# Patient Record
Sex: MALE | Race: BLACK OR AFRICAN AMERICAN | Employment: FULL TIME | URBAN - METROPOLITAN AREA
[De-identification: names, ages, dates, MRNs, and addresses within clinical notes are randomized per-mention and may not be internally consistent; named-entity substitution may affect disease eponyms.]

---

## 2022-01-10 ENCOUNTER — OFFICE VISIT (OUTPATIENT)
Dept: INTERNAL MEDICINE CLINIC | Age: 32
End: 2022-01-10
Payer: COMMERCIAL

## 2022-01-10 VITALS
WEIGHT: 177 LBS | OXYGEN SATURATION: 99 % | TEMPERATURE: 98 F | BODY MASS INDEX: 25.34 KG/M2 | RESPIRATION RATE: 16 BRPM | DIASTOLIC BLOOD PRESSURE: 84 MMHG | SYSTOLIC BLOOD PRESSURE: 114 MMHG | HEIGHT: 70 IN | HEART RATE: 71 BPM

## 2022-01-10 DIAGNOSIS — Z11.3 SCREEN FOR STD (SEXUALLY TRANSMITTED DISEASE): ICD-10-CM

## 2022-01-10 DIAGNOSIS — F90.0 ATTENTION DEFICIT HYPERACTIVITY DISORDER (ADHD), PREDOMINANTLY INATTENTIVE TYPE: Primary | ICD-10-CM

## 2022-01-10 DIAGNOSIS — Z00.00 HEALTHCARE MAINTENANCE: ICD-10-CM

## 2022-01-10 PROCEDURE — 99203 OFFICE O/P NEW LOW 30 MIN: CPT | Performed by: INTERNAL MEDICINE

## 2022-01-10 RX ORDER — DEXTROAMPHETAMINE SACCHARATE, AMPHETAMINE ASPARTATE MONOHYDRATE, DEXTROAMPHETAMINE SULFATE AND AMPHETAMINE SULFATE 2.5; 2.5; 2.5; 2.5 MG/1; MG/1; MG/1; MG/1
1 CAPSULE, EXTENDED RELEASE ORAL EVERY MORNING
COMMUNITY
Start: 2021-09-08

## 2022-01-10 NOTE — PROGRESS NOTES
1. Have you been to the ER, urgent care clinic since your last visit? Hospitalized since your last visit? NO    2. Have you seen or consulted any other health care providers outside of the [address] [place] [address] since your last visit? Include any pap smears or colon screening.  NO    Chief Complaint   Patient presents with    Establish Care     3 most recent PHQ Screens 1/10/2022   Little interest or pleasure in doing things Not at all   Feeling down, depressed, irritable, or hopeless Not at all   Total Score PHQ 2 0

## 2022-01-10 NOTE — PROGRESS NOTES
Office Visit Note:    Assessment/Plan:  1. Attention deficit hyperactivity disorder (ADHD), predominantly inattentive type    2. Screen for STD (sexually transmitted disease)    3. Healthcare maintenance        1. ADHD-follows up with psychiatry, he states that he is stable. 2. Screening for STDs-denies any high risk sexual encounters or sexual behaviors. We will check for chlamydia gonorrhea, RPR, HIV and hepatitis C     Health Maintenance:  Immunizations:  Tetanus: Last in 5 years  Influenza: advised to get it. Covid: 2 shots of pfizer    Screening:  Hepatitis C: ordered   HIV: Ordered today  Will order lipid panel to calculate 10-year cardiac risk, will also get a baseline BMP      Orders Placed This Encounter    HIV 1/2 AG/AB, 4TH GENERATION,W RFLX CONFIRM     Standing Status:   Future     Standing Expiration Date:   1/10/2023    HCV AB W/RFLX TO SHREYAS     Standing Status:   Future     Standing Expiration Date:   1/10/2023    LIPID PANEL     Standing Status:   Future     Standing Expiration Date:   2/47/6991    METABOLIC PANEL, BASIC     Standing Status:   Future     Standing Expiration Date:   1/10/2023    N. GONORRHOEA AMPLIFIED, URINE     Standing Status:   Future     Standing Expiration Date:   1/10/2023     Order Specific Question:   Specimen source     Answer:   Urine [258]    CHLAMYDIA / GC-AMPLIFIED ([facility])     Order Specific Question:   Specimen source     Answer:   Urine [258]    RPR     Standing Status:   Future     Standing Expiration Date:   1/10/2023    amphetamine-dextroamphetamine XR (ADDERALL XR) 10 mg XR capsule     Sig: Take 1 Capsule by mouth Every morning.      Social Determinants of Health     Tobacco Use:     Smoking Tobacco Use: Not on file    Smokeless Tobacco Use: Not on file   Alcohol Use:     Frequency of Alcohol Consumption: Not on file    Average Number of Drinks: Not on file    Frequency of Binge Drinking: Not on file   Financial Resource Strain:     Difficulty of Paying Living Expenses: Not on file   Food Insecurity:     Worried About Running Out of Food in the Last Year: Not on file    Ran Out of Food in the Last Year: Not on file   Transportation Needs:     Lack of Transportation (Medical): Not on file    Lack of Transportation (Non-Medical): Not on file   Physical Activity:     Days of Exercise per Week: Not on file    Minutes of Exercise per Session: Not on file   Stress:     Feeling of Stress : Not on file   Social Connections:     Frequency of Communication with Friends and Family: Not on file    Frequency of Social Gatherings with Friends and Family: Not on file    Attends Denominational Services: Not on file    Active Member of Clubs or Organizations: Not on file    Attends Club or Organization Meetings: Not on file    Marital Status: Not on file   Intimate Partner Violence:     Fear of Current or Ex-Partner: Not on file    Emotionally Abused: Not on file    Physically Abused: Not on file    Sexually Abused: Not on file   Depression: Not at risk    PHQ-2 Score: 0   Housing Stability:     Unable to Pay for Housing in the Last Year: Not on file    Number of [place] in the Last Year: Not on file    Unstable Housing in the Last Year: Not on file       Follow-up and Dispositions    · Return in about 6 months (around 7/10/2022). I have reviewed with the patient details of the assessment and plan and all questions were answered. Relevant patient education was performed. The most recent lab findings were reviewed with the patient. An After Visit Summary was printed and given to the patient. Reason for Visit: Establish Care      Subjective:  28 y.o. male with h/o ADHD comes to establish with me as a primary care. He states that he is diagnosed with ADHD in 2021 and follows up with a psychiatrist.  He also states that he has some situational depression. Denies any suicidal ideations.   He wants to get tested for STDs, but he denies any high risk sexual encounters. He apparently has some right shoulder pain for which she follows up with orthopedics and was referred to physical therapy, his pain is improving. He denies any chest pain and shortness of breath and abdominal pain and nausea any vomiting or diarrhea constipation. No fevers or chills. No penile discharge or lesions. No other complaints. Review of Systems  A complete 11 system ROS was preformed (constitutional, eyes, ENT, cardiovascular, respiratory, gastrointestinal, genitourinary, musculoskeletal, skin, neurological, psychiatric) and was negative aside from the pertinent positives and negatives noted in the HPI. No past medical history on file. No past surgical history on file. Social History     Socioeconomic History    Marital status: SINGLE   Tobacco Use    Smoking status: Never Smoker   Substance and Sexual Activity    Alcohol use: Yes     Comment: ocassionally     Drug use: No     No family history on file. Current Outpatient Medications   Medication Sig Dispense Refill    amphetamine-dextroamphetamine XR (ADDERALL XR) 10 mg XR capsule Take 1 Capsule by mouth Every morning.  ibuprofen (MOTRIN) 600 mg tablet Take 1 Tab by mouth every six (6) hours as needed for Pain. 20 Tab 1    penicillin v potassium (VEETID) 500 mg tablet Take 1 Tab by mouth four (4) times daily. 40 Tab 0    ibuprofen (MOTRIN) 600 mg tablet Take 1 Tab by mouth every six (6) hours as needed for Pain. 20 Tab 0    HYDROcodone-acetaminophen (NORCO) 5-325 mg per tablet Take 1 Tab by mouth every six (6) hours as needed for Pain. 8 Tab 0     No Known Allergies    Objective:  Visit Vitals  /84   Pulse 71   Temp 98 °F (36.7 °C) (Oral)   Resp 16   Ht 5' 10\" (1.778 m)   Wt 177 lb (80.3 kg)   SpO2 99%   BMI 25.40 kg/m²     Physical Exam:   AA&O x3. Not pale, not in any distress. HEENT: ENT negative. Neck: Supple, no JVD or lymphadenopathy.   Lungs: clear  Heart: S1 S2 +, RRR  Abdomen: Soft, No tenderness  Neuro: No focal deficits. Skin: No erythema or lesions noted. Extremities: no pedal edema, good peripheral pulses  Psych: Normal affect and mood. .  No results found for this or any previous visit. Perry Hidalgo MD, 1959 [address].   [address]

## 2022-01-10 NOTE — PATIENT INSTRUCTIONS
Well Visit, Ages 25 to 48: Care Instructions  Overview     Well visits can help you stay healthy. Your doctor has checked your overall health and may have suggested ways to take good care of yourself. Your doctor also may have recommended tests. At home, you can help prevent illness with healthy eating, regular exercise, and other steps. Follow-up care is a key part of your treatment and safety. Be sure to make and go to all appointments, and call your doctor if you are having problems. It's also a good idea to know your test results and keep a list of the medicines you take. How can you care for yourself at home? · Get screening tests that you and your doctor decide on. Screening helps find diseases before any symptoms appear. · Eat healthy foods. Choose fruits, vegetables, whole grains, protein, and low-fat dairy foods. Limit fat, especially saturated fat. Reduce salt in your diet. · Limit alcohol. If you are a man, have no more than 2 drinks a day or 14 drinks a week. If you are a woman, have no more than 1 drink a day or 7 drinks a week. · Get at least 30 minutes of physical activity on most days of the week. Walking is a good choice. You also may want to do other activities, such as running, swimming, cycling, or playing tennis or team sports. Discuss any changes in your exercise program with your doctor. · Reach and stay at a healthy weight. This will lower your risk for many problems, such as obesity, diabetes, heart disease, and high blood pressure. · Do not smoke or allow others to smoke around you. If you need help quitting, talk to your doctor about stop-smoking programs and medicines. These can increase your chances of quitting for good. · Care for your mental health. It is easy to get weighed down by worry and stress. Learn strategies to manage stress, like deep breathing and mindfulness, and stay connected with your family and community.  If you find you often feel sad or hopeless, talk with your doctor. Treatment can help. · Talk to your doctor about whether you have any risk factors for sexually transmitted infections (STIs). You can help prevent STIs if you wait to have sex with a new partner (or partners) until you've each been tested for STIs. It also helps if you use condoms (male or female condoms) and if you limit your sex partners to one person who only has sex with you. Vaccines are available for some STIs, such as HPV. · Use birth control if it's important to you to prevent pregnancy. Talk with your doctor about the choices available and what might be best for you. · If you think you may have a problem with alcohol or drug use, talk to your doctor. This includes prescription medicines (such as amphetamines and opioids) and illegal drugs (such as cocaine and methamphetamine). Your doctor can help you figure out what type of treatment is best for you. · Protect your skin from too much sun. When you're outdoors from 10 a.m. to 4 p.m., stay in the shade or cover up with clothing and a hat with a wide brim. Wear sunglasses that block UV rays. Even when it's cloudy, put broad-spectrum sunscreen (SPF 30 or higher) on any exposed skin. · See a dentist one or two times a year for checkups and to have your teeth cleaned. · Wear a seat belt in the car. When should you call for help? Watch closely for changes in your health, and be sure to contact your doctor if you have any problems or symptoms that concern you. Where can you learn more? Go to http://www.[employer].com/  Enter P072 in the search box to learn more about \"Well Visit, Ages 25 to 48: Care Instructions. \"  Current as of: February 11, 2021               Content Version: 13.0  © 2138-3840 Healthwise, Incorporated. Care instructions adapted under license by [employer] (which disclaims liability or warranty for this information).  If you have questions about a medical condition or this instruction, always ask your healthcare professional. [place] [address] any warranty or liability for your use of this information.

## 2022-01-11 LAB
ANION GAP SERPL CALC-SCNC: 6 MMOL/L (ref 5–15)
BUN SERPL-MCNC: 15 MG/DL (ref 6–20)
BUN/CREAT SERPL: 14 (ref 12–20)
C TRACH RRNA SPEC QL NAA+PROBE: NEGATIVE
CALCIUM SERPL-MCNC: 9.3 MG/DL (ref 8.5–10.1)
CHLORIDE SERPL-SCNC: 106 MMOL/L (ref 97–108)
CHOLEST SERPL-MCNC: 196 MG/DL
CO2 SERPL-SCNC: 26 MMOL/L (ref 21–32)
CREAT SERPL-MCNC: 1.1 MG/DL (ref 0.7–1.3)
GLUCOSE SERPL-MCNC: 89 MG/DL (ref 65–100)
HCV AB S/CO SERPL IA: <0.1 S/CO RATIO (ref 0–0.9)
HCV AB SERPL QL IA: NORMAL
HDLC SERPL-MCNC: 58 MG/DL
HDLC SERPL: 3.4 {RATIO} (ref 0–5)
HIV 1+2 AB+HIV1 P24 AG SERPL QL IA: NONREACTIVE
HIV12 RESULT COMMENT, HHIVC: NORMAL
LDLC SERPL CALC-MCNC: 128.4 MG/DL (ref 0–100)
N GONORRHOEA RRNA SPEC QL NAA+PROBE: NEGATIVE
POTASSIUM SERPL-SCNC: 4 MMOL/L (ref 3.5–5.1)
RPR SER QL: NONREACTIVE
SODIUM SERPL-SCNC: 138 MMOL/L (ref 136–145)
SPECIMEN SOURCE: NORMAL
TRIGL SERPL-MCNC: 48 MG/DL (ref ?–150)
VLDLC SERPL CALC-MCNC: 9.6 MG/DL

## 2022-02-24 ENCOUNTER — VIRTUAL VISIT (OUTPATIENT)
Dept: INTERNAL MEDICINE CLINIC | Age: 32
End: 2022-02-24
Payer: COMMERCIAL

## 2022-02-24 DIAGNOSIS — B34.9 VIRAL ILLNESS: Primary | ICD-10-CM

## 2022-02-24 DIAGNOSIS — R51.9 NONINTRACTABLE HEADACHE, UNSPECIFIED CHRONICITY PATTERN, UNSPECIFIED HEADACHE TYPE: ICD-10-CM

## 2022-02-24 PROCEDURE — 99213 OFFICE O/P EST LOW 20 MIN: CPT | Performed by: INTERNAL MEDICINE

## 2022-02-24 NOTE — PROGRESS NOTES
1. Have you been to the ER, urgent care clinic since your last visit? Hospitalized since your last visit?no  2. Have you seen or consulted any other health care providers outside of the [address] [place] [address] since your last visit? Include any pap smears or colon screening.  No    Chief Complaint   Patient presents with    Headache    Other     body aches     3 most recent PHQ Screens 1/10/2022   Little interest or pleasure in doing things Not at all   Feeling down, depressed, irritable, or hopeless Not at all   Total Score PHQ 2 0

## 2022-02-24 NOTE — PROGRESS NOTES
Virtual Visit Note:  I performed this visit using doxy. me. The patient gave informed verbal consent to use telemedicine. The patient understands the limitations of not been physically examined and that we may not be able to address all issues. All questions about telemedicine were answered. Assessment/Plan:  1. Viral illness    2. Nonintractable headache, unspecified chronicity pattern, unspecified headache type      He likely has an upper respiratory viral infection which is causing his headaches and body aches. He tells me his symptoms have already improved. Advised to take plenty of fluids, advised to alternate Tylenol and ibuprofen for headache. Asked him to come back and see us if he does not feel better in the next couple of weeks. Health Maintenance:  Immunizations:  Tetanus: Last in 5 years  Influenza: advised to get it. Covid: 2 shots of pfizer    Screening:  Hepatitis C: Negative  HIV: Negative        No orders of the defined types were placed in this encounter. Social Determinants of Health     Tobacco Use: Low Risk     Smoking Tobacco Use: Never Smoker    Smokeless Tobacco Use: Never Used   Alcohol Use:     Frequency of Alcohol Consumption: Not on file    Average Number of Drinks: Not on file    Frequency of Binge Drinking: Not on file   Financial Resource Strain:     Difficulty of Paying Living Expenses: Not on file   Food Insecurity:     Worried About Running Out of Food in the Last Year: Not on file    Micah of Food in the Last Year: Not on file   Transportation Needs:     Lack of Transportation (Medical): Not on file    Lack of Transportation (Non-Medical):  Not on file   Physical Activity:     Days of Exercise per Week: Not on file    Minutes of Exercise per Session: Not on file   Stress:     Feeling of Stress : Not on file   Social Connections:     Frequency of Communication with Friends and Family: Not on file    Frequency of Social Gatherings with Friends and Family: Not on file    Attends Confucianist Services: Not on file    Active Member of Clubs or Organizations: Not on file    Attends Club or Organization Meetings: Not on file    Marital Status: Not on file   Intimate Partner Violence:     Fear of Current or Ex-Partner: Not on file    Emotionally Abused: Not on file    Physically Abused: Not on file    Sexually Abused: Not on file   Depression: Not at risk    PHQ-2 Score: 0   Housing Stability:     Unable to Pay for Housing in the Last Year: Not on file    Number of [place] in the Last Year: Not on file    Unstable Housing in the Last Year: Not on file       Follow-up and Dispositions    · Return in about 3 months (around 5/24/2022). I have reviewed with the patient details of the assessment and plan and all questions were answered. Relevant patient education was performed. The most recent lab findings were reviewed with the patient. An After Visit Summary was printed and given to the patient. Reason for Visit: Headache and Other (body aches)      Subjective:  28 y.o. male with h/o ADHD is seen as virtual visit for body aches and headache. He states that 5 days ago he started having some headache and body aches but since then he is starting to get better. He states that he had episodes of this off and on for the last few times this year he denies any chest pain or shortness of breath any cough any runny nose and a sore throat or ear pain. No other complaints      Review of Systems  A complete 11 system ROS was preformed (constitutional, eyes, ENT, cardiovascular, respiratory, gastrointestinal, genitourinary, musculoskeletal, skin, neurological, psychiatric) and was negative aside from the pertinent positives and negatives noted in the HPI. History reviewed. No pertinent past medical history. History reviewed. No pertinent surgical history.   Social History     Socioeconomic History    Marital status: SINGLE   Tobacco Use    Smoking status: Never Smoker    Smokeless tobacco: Never Used   Substance and Sexual Activity    Alcohol use: Yes     Comment: ocassionally     Drug use: No     No family history on file. Current Outpatient Medications   Medication Sig Dispense Refill    amphetamine-dextroamphetamine XR (ADDERALL XR) 10 mg XR capsule Take 1 Capsule by mouth Every morning.  ibuprofen (MOTRIN) 600 mg tablet Take 1 Tab by mouth every six (6) hours as needed for Pain. 20 Tab 1    penicillin v potassium (VEETID) 500 mg tablet Take 1 Tab by mouth four (4) times daily. 40 Tab 0    ibuprofen (MOTRIN) 600 mg tablet Take 1 Tab by mouth every six (6) hours as needed for Pain. 20 Tab 0    HYDROcodone-acetaminophen (NORCO) 5-325 mg per tablet Take 1 Tab by mouth every six (6) hours as needed for Pain. 8 Tab 0     No Known Allergies    Objective: There were no vitals taken for this visit. Physical Exam:   AA&O x3. , not in any distress. Psych: Normal affect and mood. Deep Amend   Results for orders placed or performed in visit on 01/10/22   CHLAMYDIA / GC-AMPLIFIED   Result Value Ref Range    Source URINE      Chlamydia trachomatis, SHREYAS Negative Negative      Neisseria gonorrhoeae, SHREYAS Negative Negative     HCV AB W/RFLX TO SHREYAS   Result Value Ref Range    HCV Ab <0.1 0.0 - 0.9 s/co ratio   LIPID PANEL   Result Value Ref Range    Cholesterol, total 196 <200 MG/DL    Triglyceride 48 <150 MG/DL    HDL Cholesterol 58 MG/DL    LDL, calculated 128.4 (H) 0 - 100 MG/DL    VLDL, calculated 9.6 MG/DL    CHOL/HDL Ratio 3.4 0.0 - 5.0     METABOLIC PANEL, BASIC   Result Value Ref Range    Sodium 138 136 - 145 mmol/L    Potassium 4.0 3.5 - 5.1 mmol/L    Chloride 106 97 - 108 mmol/L    CO2 26 21 - 32 mmol/L    Anion gap 6 5 - 15 mmol/L    Glucose 89 65 - 100 mg/dL    BUN 15 6 - 20 MG/DL    Creatinine 1.10 0.70 - 1.30 MG/DL    BUN/Creatinine ratio 14 12 - 20      GFR est AA >60 >60 ml/min/1.73m2    GFR est non-AA >60 >60 ml/min/1.73m2    Calcium 9.3 8.5 - 10.1 MG/DL   RPR   Result Value Ref Range    RPR NONREACTIVE NONREACTIVE     HIV 1/2 AG/AB, 4TH GENERATION,W RFLX CONFIRM   Result Value Ref Range    HIV 1/2 Interpretation NONREACTIVE NONREACTIVE      HIV 1/2 result comment SEE NOTE     HCV INTERPRETATION   Result Value Ref Range    HCV Interpretation Comment           Yusuf Teran MD, FACP, [facility].   [address], [address], [place].

## 2022-02-28 ENCOUNTER — TELEPHONE (OUTPATIENT)
Dept: INTERNAL MEDICINE CLINIC | Age: 32
End: 2022-02-28

## 2022-02-28 DIAGNOSIS — T39.1X1D ACCIDENTAL ACETAMINOPHEN OVERDOSE, SUBSEQUENT ENCOUNTER: Primary | ICD-10-CM

## 2022-02-28 NOTE — TELEPHONE ENCOUNTER
Patient called. He is concerned that he took too much tylenol ( 10 pills in 2 days) on last Thursday. Will check CMP. Asked to go to ED, but he refused.

## 2022-03-02 ENCOUNTER — VIRTUAL VISIT (OUTPATIENT)
Dept: INTERNAL MEDICINE CLINIC | Age: 32
End: 2022-03-02
Payer: COMMERCIAL

## 2022-03-02 DIAGNOSIS — T39.1X1D ACCIDENTAL ACETAMINOPHEN OVERDOSE, SUBSEQUENT ENCOUNTER: Primary | ICD-10-CM

## 2022-03-02 PROCEDURE — 99212 OFFICE O/P EST SF 10 MIN: CPT | Performed by: INTERNAL MEDICINE

## 2022-03-02 NOTE — PROGRESS NOTES
Chief Complaint   Patient presents with    Results     1. Have you been to the ER, urgent care clinic since your last visit? Hospitalized since your last visit? No    2. Have you seen or consulted any other health care providers outside of the [address] [place] [address] since your last visit? Include any pap smears or colon screening.  No

## 2022-03-02 NOTE — PROGRESS NOTES
Virtual Visit Note:  I performed this visit using doxy. me. The patient gave informed verbal consent to use telemedicine. The patient understands the limitations of not been physically examined and that we may not be able to address all issues. All questions about telemedicine were answered. Assessment/Plan:  1. Accidental acetaminophen overdose, subsequent encounter      He took approximately 10 tablets of Tylenol in about 1 day approximately a week ago. Since then he has been clinically stable. His LFTs are within normal limit, his AST is at 38 which is 1 above normal, which is considered normal for him. Since he has no other symptoms and no other physical signs of liver failure I do not think he needs any further work-up. I have advised him not to take more than 3 exercise Tylenols a day and have advised him to take an NSAID in between Tylenols if he needs more pain relief in the future. Advised him to keep his regular appointment. No orders of the defined types were placed in this encounter. Social Determinants of Health     Tobacco Use: Low Risk     Smoking Tobacco Use: Never Smoker    Smokeless Tobacco Use: Never Used   Alcohol Use:     Frequency of Alcohol Consumption: Not on file    Average Number of Drinks: Not on file    Frequency of Binge Drinking: Not on file   Financial Resource Strain:     Difficulty of Paying Living Expenses: Not on file   Food Insecurity:     Worried About Running Out of Food in the Last Year: Not on file    Micah of Food in the Last Year: Not on file   Transportation Needs:     Lack of Transportation (Medical): Not on file    Lack of Transportation (Non-Medical):  Not on file   Physical Activity:     Days of Exercise per Week: Not on file    Minutes of Exercise per Session: Not on file   Stress:     Feeling of Stress : Not on file   Social Connections:     Frequency of Communication with Friends and Family: Not on file    Frequency of Social Gatherings with Friends and Family: Not on file    Attends Christianity Services: Not on file    Active Member of Clubs or Organizations: Not on file    Attends Club or Organization Meetings: Not on file    Marital Status: Not on file   Intimate Partner Violence:     Fear of Current or Ex-Partner: Not on file    Emotionally Abused: Not on file    Physically Abused: Not on file    Sexually Abused: Not on file   Depression: Not at risk    PHQ-2 Score: 0   Housing Stability:     Unable to Pay for Housing in the Last Year: Not on file    Number of [place] in the Last Year: Not on file    Unstable Housing in the Last Year: Not on file           I have reviewed with the patient details of the assessment and plan and all questions were answered. Relevant patient education was performed. The most recent lab findings were reviewed with the patient. An After Visit Summary was printed and given to the patient. Reason for Visit: Results      Subjective:  28 y.o. male who was recently seen for taking too much Tylenol tablets he is here for follow-up of his labs. There is concerned that he may have overdosed on Tylenol and at that time I had advised him to go to the ER but he decided not to go. I had ordered a LFT on him and the LFTs came back normal.  He does not show any other signs of liver failure. He does not have any bleeding disorders. He states he does not have any bleeding from his gums. He does not have any diarrhea or change in stool character. Review of Systems  A complete 11 system ROS was preformed (constitutional, eyes, ENT, cardiovascular, respiratory, gastrointestinal, genitourinary, musculoskeletal, skin, neurological, psychiatric) and was negative aside from the pertinent positives and negatives noted in the HPI. No past medical history on file. No past surgical history on file.   Social History     Socioeconomic History    Marital status: SINGLE   Tobacco Use    Smoking status: Never Smoker    Smokeless tobacco: Never Used   Substance and Sexual Activity    Alcohol use: Yes     Comment: ocassionally     Drug use: No     No family history on file. Current Outpatient Medications   Medication Sig Dispense Refill    amphetamine-dextroamphetamine XR (ADDERALL XR) 10 mg XR capsule Take 1 Capsule by mouth Every morning.  ibuprofen (MOTRIN) 600 mg tablet Take 1 Tab by mouth every six (6) hours as needed for Pain. 20 Tab 1    penicillin v potassium (VEETID) 500 mg tablet Take 1 Tab by mouth four (4) times daily. 40 Tab 0    ibuprofen (MOTRIN) 600 mg tablet Take 1 Tab by mouth every six (6) hours as needed for Pain. 20 Tab 0    HYDROcodone-acetaminophen (NORCO) 5-325 mg per tablet Take 1 Tab by mouth every six (6) hours as needed for Pain. 8 Tab 0     No Known Allergies    Objective: There were no vitals taken for this visit. Physical Exam:   AA&O x3, not in any distress. Results for orders placed or performed in visit on 95/20/37   METABOLIC PANEL, COMPREHENSIVE   Result Value Ref Range    Sodium 138 136 - 145 mmol/L    Potassium 4.7 3.5 - 5.1 mmol/L    Chloride 106 97 - 108 mmol/L    CO2 27 21 - 32 mmol/L    Anion gap 5 5 - 15 mmol/L    Glucose 94 65 - 100 mg/dL    BUN 10 6 - 20 MG/DL    Creatinine 1.00 0.70 - 1.30 MG/DL    BUN/Creatinine ratio 10 (L) 12 - 20      GFR est AA >60 >60 ml/min/1.73m2    GFR est non-AA >60 >60 ml/min/1.73m2    Calcium 9.9 8.5 - 10.1 MG/DL    Bilirubin, total 0.4 0.2 - 1.0 MG/DL    ALT (SGPT) 74 12 - 78 U/L    AST (SGOT) 38 (H) 15 - 37 U/L    Alk. phosphatase 69 45 - 117 U/L    Protein, total 8.1 6.4 - 8.2 g/dL    Albumin 4.4 3.5 - 5.0 g/dL    Globulin 3.7 2.0 - 4.0 g/dL    A-G Ratio 1.2 1.1 - 2.2           Guzman Love MD, FACP, [facility].   [address], [place], [place].

## 2022-03-19 PROBLEM — F90.0 ATTENTION DEFICIT HYPERACTIVITY DISORDER (ADHD), PREDOMINANTLY INATTENTIVE TYPE: Status: ACTIVE | Noted: 2022-01-10

## 2022-05-11 ENCOUNTER — OFFICE VISIT (OUTPATIENT)
Dept: INTERNAL MEDICINE CLINIC | Age: 32
End: 2022-05-11
Payer: COMMERCIAL

## 2022-05-11 VITALS
OXYGEN SATURATION: 93 % | BODY MASS INDEX: 24.34 KG/M2 | HEIGHT: 70 IN | TEMPERATURE: 98 F | DIASTOLIC BLOOD PRESSURE: 84 MMHG | RESPIRATION RATE: 16 BRPM | HEART RATE: 69 BPM | WEIGHT: 170 LBS | SYSTOLIC BLOOD PRESSURE: 116 MMHG

## 2022-05-11 DIAGNOSIS — Z00.00 HEALTHCARE MAINTENANCE: ICD-10-CM

## 2022-05-11 DIAGNOSIS — Z11.3 SCREEN FOR STD (SEXUALLY TRANSMITTED DISEASE): Primary | ICD-10-CM

## 2022-05-11 DIAGNOSIS — F90.0 ATTENTION DEFICIT HYPERACTIVITY DISORDER (ADHD), PREDOMINANTLY INATTENTIVE TYPE: ICD-10-CM

## 2022-05-11 PROCEDURE — 87661 TRICHOMONAS VAGINALIS AMPLIF: CPT | Performed by: INTERNAL MEDICINE

## 2022-05-11 PROCEDURE — 99213 OFFICE O/P EST LOW 20 MIN: CPT | Performed by: INTERNAL MEDICINE

## 2022-05-11 NOTE — PROGRESS NOTES
Office Visit Note:    Assessment/Plan:  1. Screen for STD (sexually transmitted disease)    2. Attention deficit hyperactivity disorder (ADHD), predominantly inattentive type    3. Healthcare maintenance      1. STD screening-asymptomatic, will check chlamydia, gonorrhea, HIV, trichomonas and syphilis. 2. ADHD-on Adderall from psychiatry    Health Maintenance:  Immunizations:  Tetanus: Last in 5 years  Influenza: advised to get it in October this year  Covid: 2 shots of pfizer     Screening:  Hepatitis C: Negative  HIV: Negative in the past     Orders Placed This Encounter    Quintin Martinez / GC-AMPLIFIED     Standing Status:   Future     Number of Occurrences:   1     Standing Expiration Date:   5/11/2023     Order Specific Question:   Specimen source     Answer:   Urine [258]    HIV 1/2 AG/AB, 4TH GENERATION,W RFLX CONFIRM     Standing Status:   Future     Number of Occurrences:   1     Standing Expiration Date:   5/11/2023    RPR     Standing Status:   Future     Number of Occurrences:   1     Standing Expiration Date:   5/11/2023     Social Determinants of Health     Tobacco Use: Low Risk     Smoking Tobacco Use: Never Smoker    Smokeless Tobacco Use: Never Used   Alcohol Use:     Frequency of Alcohol Consumption: Not on file    Average Number of Drinks: Not on file    Frequency of Binge Drinking: Not on file   Financial Resource Strain:     Difficulty of Paying Living Expenses: Not on file   Food Insecurity:     Worried About Running Out of Food in the Last Year: Not on file    Micah of Food in the Last Year: Not on file   Transportation Needs:     Lack of Transportation (Medical): Not on file    Lack of Transportation (Non-Medical):  Not on file   Physical Activity:     Days of Exercise per Week: Not on file    Minutes of Exercise per Session: Not on file   Stress:     Feeling of Stress : Not on file   Social Connections:     Frequency of Communication with Friends and Family: Not on file    Frequency of Social Gatherings with Friends and Family: Not on file    Attends Alevism Services: Not on file    Active Member of Clubs or Organizations: Not on file    Attends Club or Organization Meetings: Not on file    Marital Status: Not on file   Intimate Partner Violence:     Fear of Current or Ex-Partner: Not on file    Emotionally Abused: Not on file    Physically Abused: Not on file    Sexually Abused: Not on file   Depression: Not at risk    PHQ-2 Score: 0   Housing Stability:     Unable to Pay for Housing in the Last Year: Not on file    Number of [place] in the Last Year: Not on file    Unstable Housing in the Last Year: Not on file       Follow-up and Dispositions    · Return in about 6 months (around 11/11/2022). I have reviewed with the patient details of the assessment and plan and all questions were answered. Relevant patient education was performed. The most recent lab findings were reviewed with the patient. An After Visit Summary was printed and given to the patient. Reason for Visit: Exposure to STD      Subjective:  28 y.o. male with h/o ADHD who comes for follow-up  He wants to get an STD testing. He denies having any symptoms. Denies any dysuria or any discharge or any lesions. He has not had any recent high risk encounters. Denies any other complaints to me    Review of Systems  A complete 11 system ROS was preformed (constitutional, eyes, ENT, cardiovascular, respiratory, gastrointestinal, genitourinary, musculoskeletal, skin, neurological, psychiatric) and was negative aside from the pertinent positives and negatives noted in the HPI. History reviewed. No pertinent past medical history. History reviewed. No pertinent surgical history.   Social History     Socioeconomic History    Marital status: SINGLE   Tobacco Use    Smoking status: Never Smoker    Smokeless tobacco: Never Used   Substance and Sexual Activity    Alcohol use: Yes     Comment: ocassionally     Drug use: No     History reviewed. No pertinent family history. Current Outpatient Medications   Medication Sig Dispense Refill    amphetamine-dextroamphetamine XR (ADDERALL XR) 10 mg XR capsule Take 1 Capsule by mouth Every morning. No Known Allergies    Objective:  Visit Vitals  /84   Pulse 69   Temp 98 °F (36.7 °C) (Oral)   Resp 16   Ht 5' 10\" (1.778 m)   Wt 170 lb (77.1 kg)   SpO2 93%   BMI 24.39 kg/m²     Physical Exam:   AA&O x3. Not pale, not in any distress. HEENT: ENT negative. Lungs: clear  Heart: S1 S2 +, RRR  Abdomen: Soft, No tenderness  Neuro: No focal deficits. Skin: No erythema or lesions noted. Extremities: no pedal edema, good peripheral pulses  Psych: Normal affect and mood. Diony Mendoza MD, [address].   [place] [address], [facility].

## 2022-05-11 NOTE — PATIENT INSTRUCTIONS
Well Visit, Ages 25 to 48: Care Instructions  Overview     Well visits can help you stay healthy. Your doctor has checked your overall health and may have suggested ways to take good care of yourself. Your doctor also may have recommended tests. At home, you can help prevent illness with healthy eating, regular exercise, and other steps. Follow-up care is a key part of your treatment and safety. Be sure to make and go to all appointments, and call your doctor if you are having problems. It's also a good idea to know your test results and keep a list of the medicines you take. How can you care for yourself at home? · Get screening tests that you and your doctor decide on. Screening helps find diseases before any symptoms appear. · Eat healthy foods. Choose fruits, vegetables, whole grains, protein, and low-fat dairy foods. Limit fat, especially saturated fat. Reduce salt in your diet. · Limit alcohol. If you are a man, have no more than 2 drinks a day or 14 drinks a week. If you are a woman, have no more than 1 drink a day or 7 drinks a week. · Get at least 30 minutes of physical activity on most days of the week. Walking is a good choice. You also may want to do other activities, such as running, swimming, cycling, or playing tennis or team sports. Discuss any changes in your exercise program with your doctor. · Reach and stay at a healthy weight. This will lower your risk for many problems, such as obesity, diabetes, heart disease, and high blood pressure. · Do not smoke or allow others to smoke around you. If you need help quitting, talk to your doctor about stop-smoking programs and medicines. These can increase your chances of quitting for good. · Care for your mental health. It is easy to get weighed down by worry and stress. Learn strategies to manage stress, like deep breathing and mindfulness, and stay connected with your family and community.  If you find you often feel sad or hopeless, talk with your doctor. Treatment can help. · Talk to your doctor about whether you have any risk factors for sexually transmitted infections (STIs). You can help prevent STIs if you wait to have sex with a new partner (or partners) until you've each been tested for STIs. It also helps if you use condoms (male or female condoms) and if you limit your sex partners to one person who only has sex with you. Vaccines are available for some STIs, such as HPV. · Use birth control if it's important to you to prevent pregnancy. Talk with your doctor about the choices available and what might be best for you. · If you think you may have a problem with alcohol or drug use, talk to your doctor. This includes prescription medicines (such as amphetamines and opioids) and illegal drugs (such as cocaine and methamphetamine). Your doctor can help you figure out what type of treatment is best for you. · Protect your skin from too much sun. When you're outdoors from 10 a.m. to 4 p.m., stay in the shade or cover up with clothing and a hat with a wide brim. Wear sunglasses that block UV rays. Even when it's cloudy, put broad-spectrum sunscreen (SPF 30 or higher) on any exposed skin. · See a dentist one or two times a year for checkups and to have your teeth cleaned. · Wear a seat belt in the car. When should you call for help? Watch closely for changes in your health, and be sure to contact your doctor if you have any problems or symptoms that concern you. Where can you learn more? Go to http://delphine-shira.info/  Enter P072 in the search box to learn more about \"Well Visit, Ages 25 to 48: Care Instructions. \"  Current as of: October 6, 2021               Content Version: 13.2  © 6208-7207 Healthwise, Incorporated. Care instructions adapted under license by [employer] (which disclaims liability or warranty for this information).  If you have questions about a medical condition or this instruction, always ask your healthcare professional. [place] [address] any warranty or liability for your use of this information.

## 2022-05-11 NOTE — PROGRESS NOTES
1. Have you been to the ER, urgent care clinic since your last visit? Hospitalized since your last visit?no    2. Have you seen or consulted any other health care providers outside of the [address] [place] [address] since your last visit? Include any pap smears or colon screening.  no    Chief Complaint   Patient presents with    Exposure to STD     3 most recent PHQ Screens 5/11/2022   Little interest or pleasure in doing things Not at all   Feeling down, depressed, irritable, or hopeless Not at all   Total Score PHQ 2 0

## 2022-05-13 LAB
C TRACH RRNA SPEC QL NAA+PROBE: NEGATIVE
HIV 1+2 AB+HIV1 P24 AG SERPL QL IA: NON REACTIVE
N GONORRHOEA RRNA SPEC QL NAA+PROBE: NEGATIVE
RPR SER QL: NON REACTIVE

## 2022-10-13 ENCOUNTER — OFFICE VISIT (OUTPATIENT)
Dept: INTERNAL MEDICINE CLINIC | Age: 32
End: 2022-10-13
Payer: COMMERCIAL

## 2022-10-13 VITALS
HEART RATE: 79 BPM | TEMPERATURE: 97.7 F | OXYGEN SATURATION: 98 % | DIASTOLIC BLOOD PRESSURE: 79 MMHG | HEIGHT: 70 IN | BODY MASS INDEX: 23.59 KG/M2 | RESPIRATION RATE: 16 BRPM | WEIGHT: 164.8 LBS | SYSTOLIC BLOOD PRESSURE: 128 MMHG

## 2022-10-13 DIAGNOSIS — Z20.2 POSSIBLE EXPOSURE TO STD: ICD-10-CM

## 2022-10-13 DIAGNOSIS — Z00.00 VISIT FOR WELL MAN HEALTH CHECK: Primary | ICD-10-CM

## 2022-10-13 DIAGNOSIS — F51.01 PRIMARY INSOMNIA: ICD-10-CM

## 2022-10-13 DIAGNOSIS — M54.50 ACUTE BILATERAL LOW BACK PAIN WITHOUT SCIATICA: ICD-10-CM

## 2022-10-13 DIAGNOSIS — F90.9 ATTENTION DEFICIT HYPERACTIVITY DISORDER (ADHD), UNSPECIFIED ADHD TYPE: ICD-10-CM

## 2022-10-13 DIAGNOSIS — M62.838 TRAPEZIUS MUSCLE SPASM: ICD-10-CM

## 2022-10-13 LAB
COMMENT, HOLDF: NORMAL
SAMPLES BEING HELD,HOLD: NORMAL

## 2022-10-13 PROCEDURE — 99395 PREV VISIT EST AGE 18-39: CPT | Performed by: FAMILY MEDICINE

## 2022-10-13 PROCEDURE — 99213 OFFICE O/P EST LOW 20 MIN: CPT | Performed by: FAMILY MEDICINE

## 2022-10-13 NOTE — PROGRESS NOTES
Chief Complaint   Patient presents with    Complete Physical     he is a 33y.o. year old male who presents for CPE. Complete Physical Exam Questions:    1. Do you follow a low fat diet? yes  2. Are you up to date on your Tdap (<10 years)? Yes  3. Have you ever had a Pneumovax vaccine (>65)? No   LPL41 Not applicable   ZAXG32 Not applicable  4. Have you had Zoster vaccine (>60)? Not applicable  5. Have you had the HPV - Gardasil (13- 26)? No  6. Do you follow an exercise program?  yes  7. Do you smoke?  yes If > 65 and smoker, have you had a abdominal aortic aneurysm ultrasound screen? No  8. Do you consider yourself overweight?  no  9. Is there a family history of CAD< age 48? No  10. Is there a family history of Cancer? Yes  11. Do you know your Cancer risks? Yes  12. Have you had a colonoscopy? No  13. Have you been tested for HIV or other STI's? Yes HIV TFXZZ(72-58 y/o)? Yes   14. Have you had an EKG in the last five years(>50)? No  15. Have you had a PSA test done this year (50-69)? No    Other complaints: none    Reviewed and agree with Nurse Note and duplicated in this note. Reviewed PmHx, RxHx, FmHx, SocHx, AllgHx and updated and dated in the chart. No family history on file.     Past Medical History:   Diagnosis Date    ADHD (attention deficit hyperactivity disorder)       Social History     Socioeconomic History    Marital status: SINGLE   Tobacco Use    Smoking status: Never    Smokeless tobacco: Never   Substance and Sexual Activity    Alcohol use: Yes     Comment: ocassionally     Drug use: No        Review of Systems - negative except as listed above      Objective:     Vitals:    10/13/22 1041   BP: 128/79   Pulse: 79   Resp: 16   Temp: 97.7 °F (36.5 °C)   SpO2: 98%   Weight: 164 lb 12.8 oz (74.8 kg)   Height: 5' 10\" (1.778 m)       Physical Examination: General appearance - alert, well appearing, and in no distress  Eyes - pupils equal and reactive, extraocular eye movements intact  Ears - bilateral TM's and external ear canals normal  Nose - normal and patent, no erythema, discharge or polyps  Mouth - mucous membranes moist, pharynx normal without lesions  Neck - supple, no significant adenopathy  Chest - clear to auscultation, no wheezes, rales or rhonchi, symmetric air entry  Heart - normal rate, regular rhythm, normal S1, S2, no murmurs, rubs, clicks or gallops  Abdomen - soft, nontender, nondistended, no masses or organomegaly  Back exam - full range of motion, no tenderness, palpable spasm or pain on motion  Neurological - alert, oriented, normal speech, no focal findings or movement disorder noted  Musculoskeletal - no joint tenderness, deformity or swelling  Extremities - peripheral pulses normal, no pedal edema, no clubbing or cyanosis  Skin - normal coloration and turgor, no rashes, no suspicious skin lesions noted       Assessment/ Plan:   Diagnoses and all orders for this visit:    1. Visit for [facility] health check  -     CBC W/O DIFF; Future  -     LIPID PANEL; Future  -     METABOLIC PANEL, COMPREHENSIVE; Future    2. Possible exposure to STD  -     Kristal Thompsoner / GC-AMPLIFIED; Future  -     HIV 1/2 AG/AB, 4TH GENERATION,W RFLX CONFIRM; Future  -     RPR; Future    3. Primary insomnia    4. Attention deficit hyperactivity disorder (ADHD), unspecified ADHD type    5. Trapezius muscle spasm    6. Acute bilateral low back pain without sciatica         Labs to be drawn: CBC, CMP, Lipid            I have discussed the diagnosis with the patient and the intended plan as seen in the above orders. The patient has received an after-visit summary and questions were answered concerning future plans.        Medication Side Effects and Warnings were discussed with patient,  Patient Labs were reviewed and or requested, and  Patient Past Records were reviewed and or requested  yes       Chief Complaint   Patient presents with    Complete Physical     he is a 33y.o. year old male who presents for evaluation of low back pain and upper back pain for several years. Patient states that he believes his upper neck pain is due to pillow and also states that his mattress may need to be replaced. Patient states that he has been through physical therapy and chiropractic therapy for many years with little resolution of his symptoms. Patient is a graduate  and is on the computer a lot. Denies any neck or low back trauma history. Pain is about 3 out of 10 in nature with no radiation noted. Patient also states that he is under care of psychiatrist from [facility] for his Adderall and ADHD symptoms, states that these are well controlled    Patient states that he also suffers from long-term insomnia. Previously treated many years ago trazodone and now he uses a tincture of THC along with over-the-counter sleep aids which help to put him to bed. Patient states that his sleep schedule is getting more to normal since he has graduated. States melatonin does not work for him. Good sleep habits per patient. Reviewed and agree with Nurse Note and duplicated in this note. Reviewed PmHx, RxHx, FmHx, SocHx, AllgHx and updated and dated in the chart. History reviewed. No pertinent family history.     Past Medical History:   Diagnosis Date    ADHD (attention deficit hyperactivity disorder)       Social History     Socioeconomic History    Marital status: SINGLE   Tobacco Use    Smoking status: Never    Smokeless tobacco: Never   Substance and Sexual Activity    Alcohol use: Yes     Comment: ocassionally     Drug use: No    Sexual activity: Not Currently        Review of Systems - negative except as listed above      Objective:     Vitals:    10/13/22 1041   BP: 128/79   Pulse: 79   Resp: 16   Temp: 97.7 °F (36.5 °C)   SpO2: 98%   Weight: 164 lb 12.8 oz (74.8 kg)   Height: 5' 10\" (1.778 m)       Physical Examination: General appearance - alert, well appearing, and in no distress  Eyes - pupils equal and reactive, extraocular eye movements intact  Ears - bilateral TM's and external ear canals normal  Nose - normal and patent, no erythema, discharge or polyps  Mouth - mucous membranes moist, pharynx normal without lesions  Neck - supple, no significant adenopathy  Chest - clear to auscultation, no wheezes, rales or rhonchi, symmetric air entry  Heart - normal rate, regular rhythm, normal S1, S2, no murmurs, rubs, clicks or gallops  Abdomen - soft, nontender, nondistended, no masses or organomegaly  Musculoskeletal - no joint tenderness, deformity or swelling  Extremities - peripheral pulses normal, no pedal edema, no clubbing or cyanosis  Skin - normal coloration and turgor, no rashes, no suspicious skin lesions noted     Assessment/ Plan:   Diagnoses and all orders for this visit:    1. Visit for well man health check  -     CBC W/O DIFF; Future  -     LIPID PANEL; Future  -     METABOLIC PANEL, COMPREHENSIVE; Future    2. Possible exposure to STD  -     Vernestine Handing / GC-AMPLIFIED; Future  -     HIV 1/2 AG/AB, 4TH GENERATION,W RFLX CONFIRM; Future  -     RPR; Future    3. Primary insomnia  Given option of trazodone but at this time patient would like to stick with his tincture of THC and sleep. Combination  4. Attention deficit hyperactivity disorder (ADHD), unspecified ADHD type  Refills given by psychiatry  5. Trapezius muscle spasm  Informed of options such as cortisone injections and physical therapy. Patient would like to start with working on his pillow and mattress changes first.  Proper ergonomics recommended  6. Acute bilateral low back pain without sciatica              I have discussed the diagnosis with the patient and the intended plan as seen in the above orders. The patient has received an after-visit summary and questions were answered concerning future plans.      Medication Side Effects and Warnings were discussed with patient,  Patient Labs were reviewed and or requested, and  Patient Past Records were reviewed and or requested  yes       Pt agrees to call or return to clinic and/or go to closest ER with any worsening of symptoms. This may include, but not limited to increased fever (>100.4) with NSAIDS or Tylenol, increased edema, confusion, rash, worsening of presenting symptoms. Please note that this dictation was completed with [employer], the computer voice recognition software. Quite often unanticipated grammatical, syntax, homophones, and other interpretive errors are inadvertently transcribed by the computer software. Please disregard these errors. Please excuse any errors that have escaped final proofreading. Thank you.

## 2022-10-14 LAB
ALBUMIN SERPL-MCNC: 4.4 G/DL (ref 3.5–5)
ALBUMIN/GLOB SERPL: 1.3 {RATIO} (ref 1.1–2.2)
ALP SERPL-CCNC: 64 U/L (ref 45–117)
ALT SERPL-CCNC: 109 U/L (ref 12–78)
ANION GAP SERPL CALC-SCNC: 1 MMOL/L (ref 5–15)
AST SERPL-CCNC: 118 U/L (ref 15–37)
BILIRUB SERPL-MCNC: 0.6 MG/DL (ref 0.2–1)
BUN SERPL-MCNC: 10 MG/DL (ref 6–20)
BUN/CREAT SERPL: 9 (ref 12–20)
CALCIUM SERPL-MCNC: 9.8 MG/DL (ref 8.5–10.1)
CHLORIDE SERPL-SCNC: 104 MMOL/L (ref 97–108)
CHOLEST SERPL-MCNC: 172 MG/DL
CO2 SERPL-SCNC: 31 MMOL/L (ref 21–32)
CREAT SERPL-MCNC: 1.14 MG/DL (ref 0.7–1.3)
ERYTHROCYTE [DISTWIDTH] IN BLOOD BY AUTOMATED COUNT: 13.2 % (ref 11.5–14.5)
GLOBULIN SER CALC-MCNC: 3.4 G/DL (ref 2–4)
GLUCOSE SERPL-MCNC: 86 MG/DL (ref 65–100)
HCT VFR BLD AUTO: 48.7 % (ref 36.6–50.3)
HDLC SERPL-MCNC: 72 MG/DL
HDLC SERPL: 2.4 {RATIO} (ref 0–5)
HGB BLD-MCNC: 15.6 G/DL (ref 12.1–17)
HIV 1+2 AB+HIV1 P24 AG SERPL QL IA: NONREACTIVE
HIV12 RESULT COMMENT, HHIVC: NORMAL
LDLC SERPL CALC-MCNC: 80 MG/DL (ref 0–100)
MCH RBC QN AUTO: 27.5 PG (ref 26–34)
MCHC RBC AUTO-ENTMCNC: 32 G/DL (ref 30–36.5)
MCV RBC AUTO: 85.7 FL (ref 80–99)
NRBC # BLD: 0 K/UL (ref 0–0.01)
NRBC BLD-RTO: 0 PER 100 WBC
PLATELET # BLD AUTO: 244 K/UL (ref 150–400)
PMV BLD AUTO: 10.2 FL (ref 8.9–12.9)
POTASSIUM SERPL-SCNC: 4.3 MMOL/L (ref 3.5–5.1)
PROT SERPL-MCNC: 7.8 G/DL (ref 6.4–8.2)
RBC # BLD AUTO: 5.68 M/UL (ref 4.1–5.7)
RPR SER QL: NONREACTIVE
SODIUM SERPL-SCNC: 136 MMOL/L (ref 136–145)
TRIGL SERPL-MCNC: 100 MG/DL (ref ?–150)
VLDLC SERPL CALC-MCNC: 20 MG/DL
WBC # BLD AUTO: 4.2 K/UL (ref 4.1–11.1)

## 2022-10-17 NOTE — PROGRESS NOTES
Your liver enzymes are slightly elevated. This can be due to excess in calories, alcohol intake, some viruses and/or Tylenol use as some causes. Lets monitor these and see if these return to normal in 3 months.

## 2022-10-18 LAB
C TRACH RRNA SPEC QL NAA+PROBE: NEGATIVE
N GONORRHOEA RRNA SPEC QL NAA+PROBE: NEGATIVE
SPECIMEN SOURCE: NORMAL

## 2022-11-14 ENCOUNTER — OFFICE VISIT (OUTPATIENT)
Dept: INTERNAL MEDICINE CLINIC | Age: 32
End: 2022-11-14
Payer: COMMERCIAL

## 2022-11-14 VITALS
WEIGHT: 163 LBS | OXYGEN SATURATION: 98 % | SYSTOLIC BLOOD PRESSURE: 130 MMHG | TEMPERATURE: 98.6 F | RESPIRATION RATE: 16 BRPM | HEIGHT: 70 IN | DIASTOLIC BLOOD PRESSURE: 88 MMHG | BODY MASS INDEX: 23.34 KG/M2 | HEART RATE: 55 BPM

## 2022-11-14 DIAGNOSIS — M79.671 BILATERAL FOOT PAIN: ICD-10-CM

## 2022-11-14 DIAGNOSIS — M67.40 GANGLION, TENDON SHEATH: ICD-10-CM

## 2022-11-14 DIAGNOSIS — M77.8 EXTENSOR TENDINITIS OF FOOT: Primary | ICD-10-CM

## 2022-11-14 DIAGNOSIS — S63.502A SPRAIN OF LEFT WRIST, INITIAL ENCOUNTER: ICD-10-CM

## 2022-11-14 DIAGNOSIS — M79.672 BILATERAL FOOT PAIN: ICD-10-CM

## 2022-11-14 PROCEDURE — 99214 OFFICE O/P EST MOD 30 MIN: CPT | Performed by: FAMILY MEDICINE

## 2022-11-14 RX ORDER — NAPROXEN 500 MG/1
500 TABLET ORAL 2 TIMES DAILY WITH MEALS
Qty: 30 TABLET | Refills: 2 | Status: SHIPPED | OUTPATIENT
Start: 2022-11-14

## 2022-11-14 NOTE — PROGRESS NOTES
Chief Complaint   Patient presents with    Wrist Injury     Patient is here for left wrist pain    Foot Pain     he is a 33y.o. year old male who presents for evaluation of left Wrist. Patient also states he injured foot after tying shoe strings too tight   Pain Assessment Encounter      Shakeel Pettit  11/14/2022  Onset of Symptoms: Patient states he injured wrist 2 months ago   ________________________________________________________________________  Description:Patient states he injures wrist while playing basketball. Frequency: 5 times a day  Pain Scale:(1-10): 5  Trauma Hx: sports related trauma, basketball  Hx of similar symptoms:   Radiation: NO,   Duration:  intermittent      Progression: has improved in some ways and worsened in others  What makes it better?: exercise and OTC meds  What makes it worse?:strecthing  Medications tried: acetaminophen    Reviewed and agree with Nurse Note and duplicated in this note. Reviewed PmHx, RxHx, FmHx, SocHx, AllgHx and updated and dated in the chart. No family history on file.     Past Medical History:   Diagnosis Date    ADHD (attention deficit hyperactivity disorder)       Social History     Socioeconomic History    Marital status: SINGLE   Tobacco Use    Smoking status: Never    Smokeless tobacco: Never   Substance and Sexual Activity    Alcohol use: Yes     Comment: ocassionally     Drug use: No    Sexual activity: Not Currently        Review of Systems - negative except as listed above      Objective:     Vitals:    11/14/22 1130   BP: 130/88   Pulse: (!) 55   Resp: 16   Temp: 98.6 °F (37 °C)   SpO2: 98%   Weight: 163 lb (73.9 kg)   Height: 5' 10\" (1.778 m)       Physical Examination: General appearance - alert, well appearing, and in no distress  Back exam - full range of motion, no tenderness, palpable spasm or pain on motion  Neurological - alert, oriented, normal speech, no focal findings or movement disorder noted  Musculoskeletal -  Wrist: Left  Wrist Effusion: none  Deformity: none     ROM:  full range of motion     Palpation:  Snuff box tenderness: none  TFCC tenderness: none  Ulna styloid tenderness: none  Distal radius tenderness: none  Hook of Hamate tenderness: none  Second compartment (intersection) tenderness: none     Other test:  Trujillos test: Negative  Finkelstein's Test: Negative   Phalen's Test: Negative   Tinel's Test: Negative    Strength (0-5/5):   Flexion:5/5  Extension: 5/5  : 5/5  Intrinsics: 5/5  EPL (extensor pollicis longus): 5/5  Pinch mechanism: 5/5     A right ankle exam was performed. Palpation:  ATFL:  non-tender  CFL:  non-tender  PTFL:  non-tender  Syndesmosis Ligament:  non-tender  Deltoid ligament:  non-tender  Posterior Tibialis Tendon:  non-tender  Peroneal Tendon: non-tender  Achilles Tendon:  non-tender     Proximal Fibula:  non-tender  Proximal Tibia:  non-tender  Distal Fibula:  non-tender  Distal Tibia:  non-tender    Plantar Fascia: non-tender  Midfoot:  non-tender  Forefoot:  non-tender    ROM:  Plantar Flexion:  normal  Dorsiflexion:  normal    Squeeze Test: negative  Talar Tilt Test:  negative  Anterior Drawer:negative    Kleiger Test:  negative  Hop Test: not done  Holden: negative    Extremities - peripheral pulses normal, no pedal edema, no clubbing or cyanosis  Skin - normal coloration and turgor, no rashes, no suspicious skin lesions noted   US of right extensor tendon -hypoechoic fluid accumulation in extensor sheath with no disruption tendon  Impression: tendon ganglion  Assessment/ Plan:   Diagnoses and all orders for this visit:    1. Extensor tendinitis of foot  -     REFERRAL TO PHYSICAL THERAPY  -     AMB POC US,EXTREMITY,NONVASCULAR,REAL-TIME IMAGE,LIMITED    2. Ganglion, tendon sheath  -     REFERRAL TO PHYSICAL THERAPY  -     AMB POC US,EXTREMITY,NONVASCULAR,REAL-TIME IMAGE,LIMITED  We will start with physical therapy and if no resolution will consider cortisone shot  3.  Bilateral foot pain  -     REFERRAL TO PHYSICAL THERAPY  -     AMB POC US,EXTREMITY,NONVASCULAR,REAL-TIME IMAGE,LIMITED    4. Sprain of left wrist, initial encounter    Other orders  -     naproxen (NAPROSYN) 500 mg tablet; Take 1 Tablet by mouth two (2) times daily (with meals). resolved     Pathophysiology, recovery and rehabilitation process discussed and questions answered   Counseling for 30 Minutes of the total visit duration   Pictures and figures used as necessary   Provided reassurance   Discussed steroid side effects of fat atrophy, hypopigmentation, steroid flare or infection   Monitor response to Physical Therapy   Recommend activity modification   Recommend  lower impact activities-walking, Eliptical, Nordic Track, cycling or swimming              1) Remember to stay active and/or exercise regularly (I suggest 30-45 minutes daily)   2) For reliable dietary information, go to www. EATRIGHT.org. You may wish to consider seeing the nutritionist at [facility] [phone], also consider the [address] I have discussed the diagnosis with the patient and the intended plan as seen in the above orders. The patient has received an after-visit summary and questions were answered concerning future plans. Medication Side Effects and Warnings were discussed with patient,  Patient Labs were reviewed and or requested, and  Patient Past Records were reviewed and or requested  yes      Pt agrees to call or return to clinic and/or go to closest ER with any worsening of symptoms. This may include, but not limited to increased fever (>100.4) with NSAIDS or Tylenol, increased edema, confusion, rash, worsening of presenting symptoms. Please note that this dictation was completed with [employer], the computer voice recognition software. Quite often unanticipated grammatical, syntax, homophones, and other interpretive errors are inadvertently transcribed by the computer software. Please disregard these errors. Please excuse any errors that have escaped final proofreading. Thank you.

## 2022-11-28 ENCOUNTER — HOSPITAL ENCOUNTER (OUTPATIENT)
Dept: PHYSICAL THERAPY | Age: 32
Discharge: HOME | End: 2022-11-28
Payer: COMMERCIAL

## 2022-11-28 PROCEDURE — 97110 THERAPEUTIC EXERCISES: CPT | Performed by: PHYSICAL THERAPIST

## 2022-11-28 PROCEDURE — 97161 PT EVAL LOW COMPLEX 20 MIN: CPT | Performed by: PHYSICAL THERAPIST

## 2022-11-28 NOTE — PROGRESS NOTES
PT INITIAL EVALUATION NOTE 2-15    Patient Name: Tony Pyle  Date:2022  : 1990  [x]  Patient  Verified  Payor: BLUE CROSS / Plan: [facility] PPO / Product Type: PPO /    In time:9:15A  Out time:10:15A  Total Treatment Time (min): 60  Total Treatment time  Visit #: 1     Treatment Area: Other enthesopathies, not elsewhere classified [M77.8]  Ganglion, unspecified site [M67.40]  Pain in right foot [M79.671]  Pain in left foot [M79.672]    SUBJECTIVE  Pain Level (0-10 scale): 3/10  Any medication changes, allergies to medications, adverse drug reactions, diagnosis change, or new procedure performed?: [] No    [x] Yes (see summary sheet for update)  Subjective: The pt presents with pain on the Right foot/ankle that started due to his laces being too tight on his running shoes. The pt reports that he bought some new basketball shoes and feels that may have it worse. Pain started about 1 month ago. Has rested from running for about 2 weeks due to the pain. Tried ice once, has not taken anti-inflammatories. The pt reports a little achiness throughout the day still. The pt reports that he has custom orthotics, since 2021. Denies numbness/tingling. PLOF: running 3x per week, basketball 2x per week. Mechanism of Injury: running and basketball  Previous Treatment/Compliance: left achilles treatment. PMHx/Surgical Hx: left achilles tendon chronic tendonitis. 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 Living Situation: independent  Pt Goals: return to run  Barriers: -  Motivation: high  Substance use: -   Cognition: A & O x 4        OBJECTIVE/EXAMINATION  Posture:  Pes planus BETI  Other Observations:  traditional lacing on his running shoes. Shoes also look worn (recommended heel lock lace and to be fitted for new shoes)  Gait and Functional Mobility:  Decreased balance on right LE. Dysfunctional eccentric touch down with poor stability  Right > left. Palpation: tenderness along extensor Hallicus longus tendon at the talocrural level. ankle ROM:   Lt   Rt   DF   15   15   PF   50   55       Joint Mobility Assessment: decreased great toe extension right    Flexibility: tightness into gastroc/soleus      LOWER QUARTER   MUSCLE STRENGTH  KEY       R  L  0 - No Contraction  Knee ext  5  5  1 - Trace            flex  5  5  2 - Poor   Hip ext   5  5  3 - Fair          flex   5  5  4 - Good         abd  4  4  5 - Normal         add  5  5      Ankle DF  5  4+                PF  5  5                INV  5  5                EV  5  5        Neurological: Reflexes / Sensations: neg  Special Tests: Trendelenberg: neg    Stork: -      David: positive     Omar: -                 H.S. SLR: tightness    Piriformis Ext: tightness      Long Sit: -     Hip Scour: -      Knee Varus/Valgus Stress: -  Knee Lachmans: -      Thessaly Test: -      Other: -     10 min Therapeutic Exercise:  [x] See flow sheet :   Rationale: increase ROM, increase strength, improve coordination, improve balance, and increase proprioception to improve the patients ability to return to daily activities and recreational sport pain free.              With   [] TE   [] TA   [] Neuro   [] SC   [] other: Patient Education: [x] Review HEP    [] Progressed/Changed HEP based on:   [] positioning   [] body mechanics   [] transfers   [] heat/ice application    [] other:        Other Objective/Functional Measures: FOTO Functional Measure: 73/100              Pain Level (0-10 scale) post treatment: 0      ASSESSMENT:      [x]  See Plan of [address] Rubio, PT, DPT 11/28/2022

## 2022-11-29 NOTE — THERAPY EVALUATION
Physical Therapy at [facility],   a part of [address] [place] [address]  Phone: [phone]  Fax: [phone]      Plan of Care/Statement of Necessity for Physical Therapy Services  2-15    Patient name: Frida Vazquez  : 1990  Provider#: [id number]  Referral source: Mariana Guerrero MD      Medical/Treatment Diagnosis: Other enthesopathies, not elsewhere classified [M77.8]  Ganglion, unspecified site [M67.40]  Pain in right foot [M79.671]  Pain in left foot [M79.672]     Prior Hospitalization: see medical history     Comorbidities: -  Prior Level of Function: running, basketball  Medications: Verified on Patient Summary List  Start of Care: 23      Onset Date: 1 month ago   The Plan of Care and following information is based on the information from the initial evaluation. Assessment/ key information: The pt is a 33 y.o. male referred for the evaluation and treatment of right > left foot pain. He presents with s/s consistent with referring dx with decreased ROM, strength, balance and pain to palpation along right extensor hallicus longus. The pt would benefit from skilled physical therapy in order to address these impairments and to return him to maximal level of function pain free.       Evaluation Complexity History LOW Complexity : Zero comorbidities / personal factors that will impact the outcome / POC; Examination LOW Complexity : 1-2 Standardized tests and measures addressing body structure, function, activity limitation and / or participation in recreation  ;Presentation LOW Complexity : Stable, uncomplicated  ;Clinical Decision Making MEDIUM Complexity : FOTO score of 26-74  Overall Complexity Rating: LOW     Problem List: pain affecting function, decrease ROM, decrease strength, impaired gait/ balance, decrease ADL/ functional abilitiies, decrease activity tolerance, and decrease flexibility/ joint mobility   Treatment Plan may include any combination of the following: Therapeutic exercise, Neuromuscular reeducation, Manual therapy, Therapeutic activity, Vasopneumatic device, and Ultrasound  Patient / Family readiness to learn indicated by: asking questions, trying to perform skills, and interest  Persons(s) to be included in education: patient (P)  Barriers to Learning/Limitations: None  Patient Goal (s): decerase pain  Patient Self Reported Health Status: excellent  Rehabilitation Potential: excellent      Short and Long Term Goals: To be accomplished in 4 -6 weeks:  1) Pt will be able to run >/=  2 miles without pain. 2) Pt will be able to navigate uneven terrain without ankle pain or instability. 3) Pt will be able to run, jump and cut without pain. Frequency / Duration: Patient to be seen 2 times per week for 4 -6 weeks. Patient/ Caregiver education and instruction: self care, activity modification, brace/ splint application, and exercises    [x]  Plan of care has been reviewed with TEJ Thornton PT, DPT 11/29/2022     ________________________________________________________________________    I certify that the above Therapy Services are being furnished while the patient is under my care. I agree with the treatment plan and certify that this therapy is necessary.     [facility] Signature:____________________  Date:____________Time: _________         Anitha Haas MD

## 2022-12-06 ENCOUNTER — HOSPITAL ENCOUNTER (OUTPATIENT)
Dept: PHYSICAL THERAPY | Age: 32
Discharge: HOME | End: 2022-12-06
Payer: COMMERCIAL

## 2022-12-06 PROCEDURE — 97112 NEUROMUSCULAR REEDUCATION: CPT | Performed by: PHYSICAL THERAPIST

## 2022-12-06 PROCEDURE — 97110 THERAPEUTIC EXERCISES: CPT | Performed by: PHYSICAL THERAPIST

## 2022-12-06 PROCEDURE — 97016 VASOPNEUMATIC DEVICE THERAPY: CPT | Performed by: PHYSICAL THERAPIST

## 2022-12-06 PROCEDURE — 97140 MANUAL THERAPY 1/> REGIONS: CPT | Performed by: PHYSICAL THERAPIST

## 2022-12-06 NOTE — PROGRESS NOTES
PT DAILY TREATMENT NOTE - [facility] 2-15    Patient Name: Jose Maria Alarcon  Date:2022  : 1990  [x]  Patient  Verified  Payor: BLUE LALITHA / Plan: [employer] [id number] PPO / Product Type: PPO /    In time: 8:46  Out time: 1000a  Total Treatment Time (min): 74  Total Timed Codes (min): 64  1:1 Treatment Time ( [address] only): 59   Visit #: 2    Treatment Area: Other enthesopathies, not elsewhere classified [M77.8]  Ganglion, unspecified site [M67.40]  Pain in right foot [M79.671]  Pain in left foot [M79.672]    SUBJECTIVE  Pain Level (0-10 scale): 2/10  Any medication changes, allergies to medications, adverse drug reactions, diagnosis change, or new procedure performed?: [x] No    [] Yes (see summary sheet for update)  Subjective functional status/changes:   [] No changes reported  Left achilles is sore from trying on shoes and such yesterday. Feeling tight. Did get new running shoes yesterday from [employer]. Sized him up a size to ensure appropriate room. OBJECTIVE    Modality rationale: decrease inflammation and decrease pain to improve the patients ability to return to running pain free.     Min Type Additional Details       [] Estim: []Att   []Unatt    []TENS instruct                  []IFC  []Premod   []NMES                     []Other:  []w/US   []w/ice   []w/heat  Position:  Location:       []  Traction: [] Cervical       []Lumbar                       [] Prone          []Supine                       []Intermittent   []Continuous Lbs:  [] before manual  [] after manual  []w/heat    []  Ultrasound: []Continuous   [] Pulsed                       at: []1MHz   []3MHz Location:  W/cm2:    [] Paraffin         Location:   []w/heat    []  Ice     []  Heat  []  Ice massage Position:  Location:    []  Laser  []  Other: Position:  Location:   10   [x]  Vasopneumatic Device Pressure:       [] lo [] med [x] hi   Temperature: 34     [x] Skin assessment post-treatment:  [x]intact []redness- no adverse reaction []redness - adverse reaction:     35 min Therapeutic Exercise:  [x] See flow sheet :   Rationale: increase ROM, increase strength, improve coordination, improve balance, and increase proprioception to improve the patients ability to return to running pain free. 15 min Neuromuscular Re-education:  [x]  See flow sheet :   Rationale: increase ROM, increase strength, improve coordination, improve balance, and increase proprioception  to improve the patients ability to return to run pain free. 14 min Manual Therapy: STM to BETI anterior tibialis, extensor Hallicus longus and gastroc soleus on the left with and without IASTM. Talo crural joint mobilizations grade 3-4 to increase DF    Rationale: decrease pain, increase ROM, increase tissue extensibility, and decrease trigger points to improve the patients ability to return to running pain free. With   [] TE   [] TA   [] Neuro   [] SC   [] other: Patient Education: [x] Review HEP    [] Progressed/Changed HEP based on:   [] positioning   [] body mechanics   [] transfers   [] heat/ice application    [] other:      Other Objective/Functional Measures: -     Pain Level (0-10 scale) post treatment: 0/10    ASSESSMENT/Changes in Function:   The pt tolerated session well. Challenged with balance and hip strength today. Tightness improved with manual interventions. Patient will continue to benefit from skilled PT services to modify and progress therapeutic interventions, address functional mobility deficits, address ROM deficits, address strength deficits, analyze and address soft tissue restrictions, analyze and cue movement patterns, assess and modify postural abnormalities, and instruct in home and community integration to attain remaining goals. [x]  See Plan of Care  []  See progress note/recertification  []  See Discharge Summary         Progress towards goals / Updated goals:  Short and Long Term Goals:  To be accomplished in 4 -6 weeks:  1) Pt will be able to run >/=  2 miles without pain. 2) Pt will be able to navigate uneven terrain without ankle pain or instability. 3) Pt will be able to run, jump and cut without pain. Frequency / Duration: Patient to be seen 2 times per week for 4 -6 weeks.     PLAN  []  Upgrade activities as tolerated     [x]  Continue plan of care  []  Update interventions per flow sheet       []  Discharge due to:_  []  Other:_      Karyna Aldridge, PT, DPT 12/6/2022

## 2022-12-09 ENCOUNTER — HOSPITAL ENCOUNTER (OUTPATIENT)
Dept: PHYSICAL THERAPY | Age: 32
Discharge: HOME | End: 2022-12-09
Payer: COMMERCIAL

## 2022-12-09 PROCEDURE — 97140 MANUAL THERAPY 1/> REGIONS: CPT | Performed by: PHYSICAL THERAPIST

## 2022-12-09 PROCEDURE — 97110 THERAPEUTIC EXERCISES: CPT | Performed by: PHYSICAL THERAPIST

## 2022-12-09 PROCEDURE — 97112 NEUROMUSCULAR REEDUCATION: CPT | Performed by: PHYSICAL THERAPIST

## 2022-12-09 NOTE — PROGRESS NOTES
PT DAILY TREATMENT NOTE - [facility] 2-15    Patient Name: Sami Gruberet  Date:2022  : 1990  [x]  Patient  Verified  Payor: BLUE CROSS / Plan: [employer]RIJAL[id number] PPO / Product Type: PPO /    In time: 11:47A Out time: 1245p  Total Treatment Time (min): 58  Total Timed Codes (min): 58  1:1 Treatment Time ( only): 53  Visit #: 3    Treatment Area: Other enthesopathies, not elsewhere classified [M77.8]  Ganglion, unspecified site [M67.40]  Pain in right foot [M79.671]  Pain in left foot [M79.672]    SUBJECTIVE  Pain Level (0-10 scale): 2.5/10  Any medication changes, allergies to medications, adverse drug reactions, diagnosis change, or new procedure performed?: [x] No    [] Yes (see summary sheet for update)  Subjective functional status/changes:   [] No changes reported  Pt reproting feeling good following last session    OBJECTIVE    Modality rationale: decrease inflammation and decrease pain to improve the patients ability to return to running pain free.     Min Type Additional Details       [] Estim: []Att   []Unatt    []TENS instruct                  []IFC  []Premod   []NMES                     []Other:  []w/US   []w/ice   []w/heat  Position:  Location:       []  Traction: [] Cervical       []Lumbar                       [] Prone          []Supine                       []Intermittent   []Continuous Lbs:  [] before manual  [] after manual  []w/heat    []  Ultrasound: []Continuous   [] Pulsed                       at: []1MHz   []3MHz Location:  W/cm2:    [] Paraffin         Location:   []w/heat   10 [x]  Ice     []  Heat  []  Ice massage Position:  Location:    []  Laser  []  Other: Position:  Location:      []  Vasopneumatic Device Pressure:       [] lo [] med [x] hi   Temperature: 34     [x] Skin assessment post-treatment:  [x]intact []redness- no adverse reaction    []redness - adverse reaction:     30 min Therapeutic Exercise:  [x] See flow sheet :   Rationale: increase ROM, increase strength, improve coordination, improve balance, and increase proprioception to improve the patients ability to return to running pain free. 14 min Neuromuscular Re-education:  [x]  See flow sheet :   Rationale: increase ROM, increase strength, improve coordination, improve balance, and increase proprioception  to improve the patients ability to return to run pain free. 14 min Manual Therapy: STM to BETI anterior tibialis, extensor Hallicus longus and gastroc soleus on the left with and without IASTM. Talo crural joint mobilizations grade 3-4 to increase DF    Rationale: decrease pain, increase ROM, increase tissue extensibility, and decrease trigger points to improve the patients ability to return to running pain free. With   [] TE   [] TA   [] Neuro   [] SC   [] other: Patient Education: [x] Review HEP    [] Progressed/Changed HEP based on:   [] positioning   [] body mechanics   [] transfers   [] heat/ice application    [] other:      Other Objective/Functional Measures: -     Pain Level (0-10 scale) post treatment: 0/10    ASSESSMENT/Changes in Function:   The pt tolerated session well and challenged with BAPS L3 today. Able to add eccentric touchdowns. Feeling improvement since last session. Patient will continue to benefit from skilled PT services to modify and progress therapeutic interventions, address functional mobility deficits, address ROM deficits, address strength deficits, analyze and address soft tissue restrictions, analyze and cue movement patterns, assess and modify postural abnormalities, and instruct in home and community integration to attain remaining goals. [x]  See Plan of Care  []  See progress note/recertification  []  See Discharge Summary         Progress towards goals / Updated goals:  Short and Long Term Goals: To be accomplished in 4 -6 weeks:  1) Pt will be able to run >/=  2 miles without pain.   2) Pt will be able to navigate uneven terrain without ankle pain or instability. 3) Pt will be able to run, jump and cut without pain. Frequency / Duration: Patient to be seen 2 times per week for 4 -6 weeks.     PLAN  []  Upgrade activities as tolerated     [x]  Continue plan of care  []  Update interventions per flow sheet       []  Discharge due to:_  []  Other:_      Perez Godinez, PT, DPT 12/9/2022

## 2022-12-12 ENCOUNTER — HOSPITAL ENCOUNTER (OUTPATIENT)
Dept: PHYSICAL THERAPY | Age: 32
Discharge: HOME | End: 2022-12-12
Payer: COMMERCIAL

## 2022-12-12 PROCEDURE — 97140 MANUAL THERAPY 1/> REGIONS: CPT

## 2022-12-12 PROCEDURE — 97110 THERAPEUTIC EXERCISES: CPT

## 2022-12-12 NOTE — PROGRESS NOTES
PT DAILY TREATMENT NOTE - [facility] 2-15    Patient Name: Gracy Luis  VKRC:  : 1990  [x]  Patient  Verified  Payor: BLUE CROSS / Plan: [employer] [id number] PPO / Product Type: PPO /    In time: 8:40am  Out time: 9:29am  Total Treatment Time (min): 49  Total Timed Codes (min): 49  1:1 Treatment Time ( only): 44   Visit #:  4    Treatment Area: Other enthesopathies, not elsewhere classified [M77.8]  Ganglion, unspecified site [M67.40]  Pain in right foot [M79.671]  Pain in left foot [M79.672]    SUBJECTIVE  Pain Level (0-10 scale): 2/10  Any medication changes, allergies to medications, adverse drug reactions, diagnosis change, or new procedure performed?: [x] No    [] Yes (see summary sheet for update)  Subjective functional status/changes:   [] No changes reported  Pt reported having less pain, but also stated he jammed his L toe yesterday. Had pain in his L toe but decreased overnight. The Student Physical Therapist Assistant participated in the delivery of services under the direction of Augusto Leos OPTA; directing the service, making the skilled judgment, and who is responsible for the supervision of treatment. OBJECTIVE  31 min Therapeutic Exercise:  [x] See flow sheet :   Rationale: increase ROM, increase strength, improve coordination, improve balance, and increase proprioception to improve the patients ability to return to running with no pain. 18 min Manual Therapy: STM to L anterior tibialis, extensor Hallicus longus and  B STM to gastroc, soleus with and without IASTM. A/P Talo crural joint mobilizations grade 3-4   Rationale: decrease pain, increase ROM, increase tissue extensibility, decrease edema , and decrease trigger points to improve the patients ability to return to running with decreased pain.            With   [] TE   [] TA   [] Neuro   [] SC   [] other: Patient Education: [x] Review HEP    [] Progressed/Changed HEP based on:   [] positioning   [] body mechanics   [] transfers   [] heat/ice application    [] other:      Other Objective/Functional Measures: --     Pain Level (0-10 scale) post treatment: 2/10    ASSESSMENT/Changes in Function:   Pt tolerated today's session well with more control on the BAPs board. Was a bit more tight in his calf muscles today. Patient will continue to benefit from skilled PT services to modify and progress therapeutic interventions, address functional mobility deficits, address ROM deficits, address strength deficits, analyze and address soft tissue restrictions, analyze and cue movement patterns, analyze and modify body mechanics/ergonomics, and assess and modify postural abnormalities to attain remaining goals. []  See Plan of Care  []  See progress note/recertification  []  See Discharge Summary         Progress towards goals / Updated goals:  Short and Long Term Goals: To be accomplished in 4 -6 weeks:  1) Pt will be able to run >/=  2 miles without pain. 2) Pt will be able to navigate uneven terrain without ankle pain or instability. 3) Pt will be able to run, jump and cut without pain. Frequency / Duration: Patient to be seen 2 times per week for 4 -6 weeks.     PLAN  [x]  Upgrade activities as tolerated     [x]  Continue plan of care  []  Update interventions per flow sheet       []  Discharge due to:_  []  Other:_      FRANSISCA Kelley 12/12/2022

## 2022-12-15 ENCOUNTER — HOSPITAL ENCOUNTER (OUTPATIENT)
Dept: PHYSICAL THERAPY | Age: 32
Discharge: HOME | End: 2022-12-15
Payer: COMMERCIAL

## 2022-12-15 PROCEDURE — 97140 MANUAL THERAPY 1/> REGIONS: CPT

## 2022-12-15 PROCEDURE — 97110 THERAPEUTIC EXERCISES: CPT

## 2022-12-15 NOTE — PROGRESS NOTES
PT DAILY TREATMENT NOTE - [facility] 2-15    Patient Name: Jaimee Alvarado  EIAL:  : 1990  [x]  Patient  Verified  Payor: BLUE LALITHA / Plan: [employer] [id number] PPO / Product Type: PPO /    In time: 8:52A  Out time: 10:12A  Total Treatment Time (min): 80  Total Timed Codes (min): 80  1:1 Treatment Time ( only): 65   Visit #:  5    Treatment Area: Other enthesopathies, not elsewhere classified [M77.8]  Ganglion, unspecified site [M67.40]  Pain in right foot [M79.671]  Pain in left foot [M79.672]    SUBJECTIVE  Pain Level (0-10 scale): 2/10  Any medication changes, allergies to medications, adverse drug reactions, diagnosis change, or new procedure performed?: [x] No    [] Yes (see summary sheet for update)  Subjective functional status/changes:   [] No changes reported  Pt reports big toe is aching more this morning. Did not sleep much last night due to work and he is feeling more tightness in the calf this morning. OBJECTIVE    57 min Therapeutic Exercise:  [x] See flow sheet :   Rationale: increase ROM, increase strength, improve coordination, improve balance, and increase proprioception to improve the patients ability to return to running with no pain. 23 min Manual Therapy: STM to L anterior tibialis, extensor Hallicus longus and  B STM to gastroc, soleus with and without IASTM. A/P Talo crural joint mobilizations grade 3-4   Rationale: decrease pain, increase ROM, increase tissue extensibility, decrease edema , and decrease trigger points to improve the patients ability to return to running with decreased pain.            With   [] TE   [] TA   [] Neuro   [] SC   [] other: Patient Education: [x] Review HEP    [] Progressed/Changed HEP based on:   [] positioning   [] body mechanics   [] transfers   [] heat/ice application    [] other:      Other Objective/Functional Measures: --     Pain Level (0-10 scale) post treatment: 0/10    ASSESSMENT/Changes in Function:   Pt reported complete resolution of stiffness following manual. Difficulty on standing BAPS with L side in initiating eversion. Needed assistance with this. Patient will continue to benefit from skilled PT services to modify and progress therapeutic interventions, address functional mobility deficits, address ROM deficits, address strength deficits, analyze and address soft tissue restrictions, analyze and cue movement patterns, analyze and modify body mechanics/ergonomics, and assess and modify postural abnormalities to attain remaining goals. []  See Plan of Care  []  See progress note/recertification  []  See Discharge Summary         Progress towards goals / Updated goals:  Short and Long Term Goals: To be accomplished in 4 -6 weeks:  1) Pt will be able to run >/=  2 miles without pain. 2) Pt will be able to navigate uneven terrain without ankle pain or instability. 3) Pt will be able to run, jump and cut without pain. Frequency / Duration: Patient to be seen 2 times per week for 4 -6 weeks.     PLAN  [x]  Upgrade activities as tolerated     [x]  Continue plan of care  []  Update interventions per flow sheet       []  Discharge due to:_  []  Other:_      Natalyae Linear, PTA 12/15/2022

## 2022-12-20 ENCOUNTER — HOSPITAL ENCOUNTER (OUTPATIENT)
Dept: PHYSICAL THERAPY | Age: 32
End: 2022-12-20
Payer: COMMERCIAL

## 2022-12-27 ENCOUNTER — HOSPITAL ENCOUNTER (OUTPATIENT)
Dept: PHYSICAL THERAPY | Age: 32
Discharge: HOME | End: 2022-12-27
Payer: COMMERCIAL

## 2022-12-27 PROCEDURE — 97110 THERAPEUTIC EXERCISES: CPT

## 2022-12-27 PROCEDURE — 97140 MANUAL THERAPY 1/> REGIONS: CPT

## 2022-12-27 NOTE — PROGRESS NOTES
PT DAILY TREATMENT NOTE - [facility] 2-15    Patient Name: Gracy Luis  CTNL:[id number]  : 1990  [x]  Patient  Verified  Payor: BLUE CROSS / Plan: Channing Awad [id number] PPO / Product Type: PPO /    In time: 8:43am  Out time: 9:33am  Total Treatment Time (min): 50  Total Timed Codes (min): 50  1:1 Treatment Time ( only): 45   Visit #:  6    Treatment Area: Other enthesopathies, not elsewhere classified [M77.8]  Ganglion, unspecified site [M67.40]  Pain in right foot [M79.671]  Pain in left foot [M79.672]    SUBJECTIVE  Pain Level (0-10 scale): 2/10  Any medication changes, allergies to medications, adverse drug reactions, diagnosis change, or new procedure performed?: [x] No    [] Yes (see summary sheet for update)  Subjective functional status/changes:   [] No changes reported  Pt. Reported having a litte bit of pain on the front part of his  R ankle. The Student Physical Therapist Assistant participated in the delivery of services under the direction of Augusto Leos OPTA; directing the service, making the skilled judgment, and who is responsible for the supervision of treatment. OBJECTIVE  35 min Therapeutic Exercise:  [x] See flow sheet :   Rationale: increase ROM, increase strength, improve coordination, improve balance, and increase proprioception to improve the patients ability to return to running with no pain. 15 min Manual Therapy: STM to L anterior tibialis, extensor Hallicus longus and  B STM to gastroc, soleus with and without IASTM. A/P Talo crural joint mobilizations grade 3-4    Rationale: decrease pain, increase ROM, increase tissue extensibility, decrease edema , decrease trigger points, and increase postural awareness to improve the patients ability to return to running with decreased pain.       With   [] TE   [] TA   [] Neuro   [] SC   [] other: Patient Education: [x] Review HEP    [] Progressed/Changed HEP based on:   [] positioning   [] body mechanics   [] transfers   [] heat/ice application    [] other:      Other Objective/Functional Measures: --     Pain Level (0-10 scale) post treatment: 1-2/10    ASSESSMENT/Changes in Function:   Increased resistance on exercises and patient was challenged. When performing the BAPs board the Pt. required UE assistance when on the L foot. Patient will continue to benefit from skilled PT services to modify and progress therapeutic interventions, address functional mobility deficits, address ROM deficits, address strength deficits, analyze and address soft tissue restrictions, analyze and cue movement patterns, analyze and modify body mechanics/ergonomics, assess and modify postural abnormalities, and instruct in home and community integration to attain remaining goals. []  See Plan of Care  []  See progress note/recertification  []  See Discharge Summary         Progress towards goals / Updated goals:  Short and Long Term Goals: To be accomplished in 4 -6 weeks:  1) Pt will be able to run >/=  2 miles without pain. 2) Pt will be able to navigate uneven terrain without ankle pain or instability. 3) Pt will be able to run, jump and cut without pain. Frequency / Duration: Patient to be seen 2 times per week for 4 -6 weeks.     PLAN  [x]  Upgrade activities as tolerated     [x]  Continue plan of care  []  Update interventions per flow sheet       []  Discharge due to:_  []  Other:_      FRANSISCA Jones 12/27/2022

## 2022-12-30 ENCOUNTER — HOSPITAL ENCOUNTER (OUTPATIENT)
Dept: PHYSICAL THERAPY | Age: 32
End: 2022-12-30
Payer: COMMERCIAL

## 2022-12-30 PROCEDURE — 97140 MANUAL THERAPY 1/> REGIONS: CPT | Performed by: PHYSICAL THERAPIST

## 2022-12-30 PROCEDURE — 97110 THERAPEUTIC EXERCISES: CPT | Performed by: PHYSICAL THERAPIST

## 2023-01-06 ENCOUNTER — VIRTUAL VISIT (OUTPATIENT)
Dept: INTERNAL MEDICINE CLINIC | Age: 33
End: 2023-01-06
Payer: COMMERCIAL

## 2023-01-06 DIAGNOSIS — M76.891 HAMSTRING TENDONITIS OF RIGHT THIGH: ICD-10-CM

## 2023-01-06 DIAGNOSIS — M77.8 EXTENSOR TENDINITIS OF FOOT: Primary | ICD-10-CM

## 2023-01-06 NOTE — PROGRESS NOTES
Jose Pham is a 33 y.o. male who was seen by synchronous (real-time) audio-video technology on 1/6/2023 for Foot Pain        Assessment & Plan:   Diagnoses and all orders for this visit:    1. Extensor tendinitis of foot  -     XR FOOT RT MIN 3 V; Future  -     REFERRAL TO PHYSICAL THERAPY    2. Hamstring tendonitis of right thigh  -     REFERRAL TO PHYSICAL THERAPY  Recommend patient start taking the prescription naproxen 500 mg twice a day  Will restart with physical therapy  Stressed importance of pain attention to his lacing patterns and possibly skipping laces if needed. We will discuss this further with physical therapy    Subjective:   Patient is following up on right foot pain. Patient has gone to physical therapy and states that changing the amount he thinks that sugars have greatly helped his extensor tendon. Although after the last session a couple days later he felt that he had a lot of right hamstring pain, this was 4 out of 5 and also really flareup of his extensor tendinitis in his foot. Patient states that he may have tightened his shoes just a touch too much and gone for a run. Believes that his hamstring tendinitis was from some biometrics that were done during physical therapy. Denies any pain with ambulation or any popping sounds. Prior to Admission medications    Medication Sig Start Date End Date Taking? Authorizing Provider   naproxen (NAPROSYN) 500 mg tablet Take 1 Tablet by mouth two (2) times daily (with meals). 11/14/22   Rosa Nunez MD   amphetamine-dextroamphetamine XR (ADDERALL XR) 10 mg XR capsule Take 1 Capsule by mouth Every morning.  9/8/21   Provider, Historical     Patient Active Problem List   Diagnosis Code    Attention deficit hyperactivity disorder (ADHD), predominantly inattentive type F90.0     Patient Active Problem List    Diagnosis Date Noted    Attention deficit hyperactivity disorder (ADHD), predominantly inattentive type 01/10/2022     Current Outpatient Medications   Medication Sig Dispense Refill    naproxen (NAPROSYN) 500 mg tablet Take 1 Tablet by mouth two (2) times daily (with meals). 30 Tablet 2    amphetamine-dextroamphetamine XR (ADDERALL XR) 10 mg XR capsule Take 1 Capsule by mouth Every morning. No Known Allergies  Past Medical History:   Diagnosis Date    ADHD (attention deficit hyperactivity disorder)      No past surgical history on file. No family history on file. Social History     Tobacco Use    Smoking status: Never    Smokeless tobacco: Never   Substance Use Topics    Alcohol use: Yes     Comment: ocassionally        ROS    Objective:   No flowsheet data found.      [INSTRUCTIONS:  \"[x]\" Indicates a positive item  \"[]\" Indicates a negative item  -- DELETE ALL ITEMS NOT EXAMINED]    Constitutional: [x] Appears well-developed and well-nourished [x] No apparent distress      [] Abnormal -     Mental status: [x] Alert and awake  [x] Oriented to person/place/time [x] Able to follow commands    [] Abnormal -     Eyes:   EOM    [x]  Normal    [] Abnormal -   Sclera  [x]  Normal    [] Abnormal -          Discharge [x]  None visible   [] Abnormal -     HENT: [x] Normocephalic, atraumatic  [] Abnormal -   [x] Mouth/Throat: Mucous membranes are moist    External Ears [x] Normal  [] Abnormal -    Neck: [x] No visualized mass [] Abnormal -     Pulmonary/Chest: [x] Respiratory effort normal   [x] No visualized signs of difficulty breathing or respiratory distress        [] Abnormal -      Musculoskeletal:   [x] Normal gait with no signs of ataxia         [x] Normal range of motion of neck        [] Abnormal -     Neurological:        [x] No Facial Asymmetry (Cranial nerve 7 motor function) (limited exam due to video visit)          [x] No gaze palsy        [] Abnormal -          Skin:        [x] No significant exanthematous lesions or discoloration noted on facial skin         [] Abnormal -            Psychiatric:       [x] Normal Affect [] Abnormal -        [x] No Hallucinations    Other pertinent observable physical exam findings:-        We discussed the expected course, resolution and complications of the diagnosis(es) in detail. Medication risks, benefits, costs, interactions, and alternatives were discussed as indicated. I advised him to contact the office if his condition worsens, changes or fails to improve as anticipated. He expressed understanding with the diagnosis(es) and plan. Beverly Driver, was evaluated through a synchronous (real-time) audio-video encounter. The patient (or guardian if applicable) is aware that this is a billable service, which includes applicable co-pays. This Virtual Visit was conducted with patient's (and/or legal guardian's) consent. The visit was conducted pursuant to the emergency declaration under the [address] [place] [address] authority and the [employer] and [employer] General Act. Patient identification was verified, and a caregiver was present when appropriate. The patient was located at: Home: [place] [phone]  The provider was located at: Facility (Appt Department): [facility].  Diana Serra MD

## 2023-01-16 ENCOUNTER — HOSPITAL ENCOUNTER (OUTPATIENT)
Dept: PHYSICAL THERAPY | Age: 33
Discharge: HOME | End: 2023-01-16
Payer: COMMERCIAL

## 2023-01-16 ENCOUNTER — VIRTUAL VISIT (OUTPATIENT)
Dept: INTERNAL MEDICINE CLINIC | Age: 33
End: 2023-01-16
Payer: COMMERCIAL

## 2023-01-16 DIAGNOSIS — M77.8 EXTENSOR TENDINITIS OF FOOT: Primary | ICD-10-CM

## 2023-01-16 DIAGNOSIS — Z20.2 POSSIBLE EXPOSURE TO STD: Primary | ICD-10-CM

## 2023-01-16 DIAGNOSIS — M76.891 HAMSTRING TENDONITIS OF RIGHT THIGH: ICD-10-CM

## 2023-01-16 PROCEDURE — 97110 THERAPEUTIC EXERCISES: CPT | Performed by: PHYSICAL THERAPIST

## 2023-01-16 PROCEDURE — 99214 OFFICE O/P EST MOD 30 MIN: CPT | Performed by: FAMILY MEDICINE

## 2023-01-16 PROCEDURE — 97164 PT RE-EVAL EST PLAN CARE: CPT | Performed by: PHYSICAL THERAPIST

## 2023-01-16 NOTE — PROGRESS NOTES
Shalonda Mohan is a 33 y.o. male who was seen by synchronous (real-time) audio-video technology on 1/16/2023 for Hypertension and Sexually Transmitted Disease        Assessment & Plan:   Diagnoses and all orders for this visit:    1. Possible exposure to STD  -     Daija Toney / ENDY-AMPLIFIED; Future  -     RPR; Future  -     HIV 1/2 AG/AB, 4TH GENERATION,W RFLX CONFIRM; Future          Subjective:   Patient is a 18-year-old male who is seen today for ICD follow-up. Patient states that he had sexual intercourse unprotected with partner. Patient states that she had a recent test done and it was normal but he would like to get double checked. Patient denies any symptoms    Prior to Admission medications    Medication Sig Start Date End Date Taking? Authorizing Provider   naproxen (NAPROSYN) 500 mg tablet Take 1 Tablet by mouth two (2) times daily (with meals). 11/14/22   Javier Stephens MD   amphetamine-dextroamphetamine XR (ADDERALL XR) 10 mg XR capsule Take 1 Capsule by mouth Every morning. 9/8/21   Provider, Historical     Patient Active Problem List   Diagnosis Code    Attention deficit hyperactivity disorder (ADHD), predominantly inattentive type F90.0     Patient Active Problem List    Diagnosis Date Noted    Attention deficit hyperactivity disorder (ADHD), predominantly inattentive type 01/10/2022     Current Outpatient Medications   Medication Sig Dispense Refill    naproxen (NAPROSYN) 500 mg tablet Take 1 Tablet by mouth two (2) times daily (with meals). 30 Tablet 2    amphetamine-dextroamphetamine XR (ADDERALL XR) 10 mg XR capsule Take 1 Capsule by mouth Every morning. No Known Allergies  Past Medical History:   Diagnosis Date    ADHD (attention deficit hyperactivity disorder)      No past surgical history on file. No family history on file.   Social History     Tobacco Use    Smoking status: Never    Smokeless tobacco: Never   Substance Use Topics    Alcohol use: Yes     Comment: ocassionally ROS    Objective:   No flowsheet data found. [INSTRUCTIONS:  \"[x]\" Indicates a positive item  \"[]\" Indicates a negative item  -- DELETE ALL ITEMS NOT EXAMINED]    Constitutional: [x] Appears well-developed and well-nourished [x] No apparent distress      [] Abnormal -     Mental status: [x] Alert and awake  [x] Oriented to person/place/time [x] Able to follow commands    [] Abnormal -     Eyes:   EOM    [x]  Normal    [] Abnormal -   Sclera  [x]  Normal    [] Abnormal -          Discharge [x]  None visible   [] Abnormal -     HENT: [x] Normocephalic, atraumatic  [] Abnormal -   [x] Mouth/Throat: Mucous membranes are moist    External Ears [x] Normal  [] Abnormal -    Neck: [x] No visualized mass [] Abnormal -     Pulmonary/Chest: [x] Respiratory effort normal   [x] No visualized signs of difficulty breathing or respiratory distress        [] Abnormal -      Musculoskeletal:   [x] Normal gait with no signs of ataxia         [x] Normal range of motion of neck        [] Abnormal -     Neurological:        [x] No Facial Asymmetry (Cranial nerve 7 motor function) (limited exam due to video visit)          [x] No gaze palsy        [] Abnormal -          Skin:        [x] No significant exanthematous lesions or discoloration noted on facial skin         [] Abnormal -            Psychiatric:       [x] Normal Affect [] Abnormal -        [x] No Hallucinations    Other pertinent observable physical exam findings:-        We discussed the expected course, resolution and complications of the diagnosis(es) in detail. Medication risks, benefits, costs, interactions, and alternatives were discussed as indicated. I advised him to contact the office if his condition worsens, changes or fails to improve as anticipated. He expressed understanding with the diagnosis(es) and plan. Natty Wright, was evaluated through a synchronous (real-time) audio-video encounter.  The patient (or guardian if applicable) is aware that this is a billable service, which includes applicable co-pays. This Virtual Visit was conducted with patient's (and/or legal guardian's) consent. The visit was conducted pursuant to the emergency declaration under the [address] [place] [address], [address] [place] [address] authority and the [employer] and [employer] Act. Patient identification was verified, and a caregiver was present when appropriate. The patient was located at: Home: [place] [phone]  The provider was located at: Facility (Appt Department): [address] MD

## 2023-01-16 NOTE — PROGRESS NOTES
Physical Therapy at [facility],   a part of [address]  [address] [address], [address] [place] [address], [address] [place] [address]  Phone: [phone] Fax: [phone]    Continued Plan of Care/ Re-certification for Physical Therapy Services    Patient name: Jeremi Simons  : 1990  Provider#: [id number]  Referral source: Maddie Welch MD      Medical/Treatment Diagnosis: Extensor tendinitis of foot [M77.8]  Hamstring tendonitis of right thigh [M76.891]     Prior Hospitalization: see medical history     Comorbidities: --  Prior Level of Function: Running and basketball   Medications: Verified on Patient Summary List    Start of Care: 2022      Onset Date: 10/2022     Subjective: Ankle--Continues to have pain to anterior ankle, ~3/10. Reports he had a flare up when wearing less supportive tennis shoes ~3 weeks ago, and states it has calmed down. Now wants preventative method to stop pain, ways to tie shoes tighter without increasing pain, and self massage ideas when anterior ankle gets tight/painful. Wants to know if a brace would help. Will be getting imaging after PT. Hamstring--Since last session, when warming up to run (plyometrics, skips), everything was fine when he went to bed. When woke up the next morning, hamstring has been sore like it was strained. Started isometrics ~5 days ago and it has helped. Patient demonstrates that he's doing bent leg hamstring isometrics, straight leg hamstring isometrics, and bridges with hamstring isometric. States hamstring feels tight but thinks it'sbecause of isometrics. Bending over/hinging at hip causes pain. Hasn't tried to do a hamstring stretch since the injury.        Other Observations:  pes planus bilaterally  Palpation: tenderness to mid belly/musculotendinous junction of medial hamstring    Bilateral Knee AROM WNL     Ankle ROM:   AROM        R  L    DF--straight leg 2 degrees 6 degrees         Flexibility: 90/90 Hamstring flexibility--lacking 25 degrees, 2/10 pain      LOWER QUARTER   MUSCLE STRENGTH  KEY       R  L  0 - No Contraction  Knee ext  5/5  5/5  1 - Trace            flex  4/5  5/5  2 - Poor   Hip ext   4/5  5/5  3 - Fair          flex   5/5  5/5  4 - Good         abd  4/5  5/5  5 - Normal         add  5/5  5/5      Ankle DF  5/5  5/5                PF  5/5  5/5                 Glute max 4/5 bilaterally   Quadrant test: all directions 5/5, posterior tib (PF + IV) had increase in pain  Demonstrates difficulty controlling IV eccentrically      Special Tests:                  H.S. SLR: 25 R, 20 L        Key functional changes: Increased hamstring pain, increase ankle pain      Problems/ barriers to goal attainment: --     Problem List: pain affecting function, decrease ROM, decrease strength, and decrease flexibility/ joint mobility    Treatment Plan: Therapeutic exercise, Neuromuscular reeducation, Manual therapy, Therapeutic activity, and Vasopneumatic device    Treatment was performed with direct supervision by Noy Clark, PT,  DPT. Patient Goal (s) has been updated and includes: Hamstring specific goals due to new diagnosis added to treatment cycle. Goals for this certification period to be accomplished in 3 weeks:  Pt will reduce hamstring pain to 0/10 to improve QOL and facilitate return to PLOF. Pt will improve gastroc flexibility by 5 degrees on R to improve foot biomechanics and decrease pain. Goals for this certification period to be accomplished in 6 weeks:  Pt will increase hamstring and global hip strength to 5/5 with no pain to improve strength and prepare pt for return to running. Pt will be able to run at least 2 miles without increase in ankle/hamstring pain. Pt will improve hamstring flexibility by 5 degrees to improve tissue extensibility and reduce strain.    Pt will be able to run, cut, and jump without pain to facilitate safe return to basketball    Frequency / Duration: Patient to be seen 2 times per week for 6 weeks. Assessment / Recommendations:Pt is a 33 y.o. male who has been seen in PT since 11/28/2022 for complaints of anterior ankle pain. He has since had increased complaints of pain in the R hamstring beginning ~3 weeks ago. Pt demonstrates increased ankle and hamstring pain, decreased hamstring and gastroc flexibility, decreased ankle DF ROM, and decreased hamstring and hip strength, impacting his ability ambulate in the community and run. Pt would benefit from skilled PT to address deficits and new onset hamstring pain, progressing POC per pt tolerance. Certification Period: 4/16/2023    Delicia Vivian, SPT 1/16/2023    ________________________________________________________________________  I certify that the above Therapy Services are being furnished while the patient is under my care. I agree with the treatment plan and certify that this therapy is necessary. Y or N I have read the above and request that my patient continue as recommended.   Y or N I have read the above report and request that my patient continue therapy with the following changes/special instructions  Y or N I have read the above report and request that my patient be discharged from therapy    Physician's Signature:_________________ Date:___________Time:__________           Ry Coyne MD

## 2023-01-16 NOTE — PROGRESS NOTES
PT DAILY TREATMENT NOTE - [facility] 2-15    Patient Name: Alexa Justice  Date:2023  : 1990  [x]  Patient  Verified  Payor: BLUE CROSS / Plan: [facility] PPO / Product Type: PPO /    In time: 8:35am  Out time: 9:45 am  Total Treatment Time (min): 70  Total Timed Codes (min): 25  1:1 Treatment Time ( only): 25  Visit #:  8    Treatment Area: Extensor tendinitis of foot [M77.8]  Hamstring tendonitis of right thigh [M76.891]    SUBJECTIVE  Pain Level (0-10 scale): 1/10  Any medication changes, allergies to medications, adverse drug reactions, diagnosis change, or new procedure performed?: [x] No    [] Yes (see summary sheet for update)  Subjective functional status/changes:   [] No changes reported  Re-evaluation was performed to add the hamstring diagnosis to POC. Subjective report regarding hamstring--Reports since last session that initiated warming up to run (plyometrics, skips), everything was fine when he went to bed. When woke up the next morning, hamstring has been sore like it was strained. Started isometrics ~5 days ago and it has helped. Patient demonstrates that he's doing bent leg hamstring isometrics, straight leg hamstring isometrics, and bridges with hamstring isometric. States hamstring feels tight but thinks it'sbecause of isometrics. Bending over/hinging at hip causes pain. Hasn't tried to do a hamstring stretch since the injury. OBJECTIVE    25 min Therapeutic Exercise:  [x] See flow sheet : Updated HEP--instructed to perform eccentric prone HS curl, 4-way ankle, hamstring isometrics with knees bent, and bridges    Rationale: increase ROM, increase strength, improve coordination, improve balance, and increase proprioception to improve the patients ability to return to running with no pain.       With   [] TE   [] TA   [] Neuro   [] SC   [] other: Patient Education: [x] Review HEP    [] Progressed/Changed HEP based on:   [] positioning   [] body mechanics   [] transfers   [x] heat/ice application    [x] other: Education regarding hamstring and ankle injury, purchase dynadisc to simulate BAPS, education regarding brace possibly increasing pain     Other Objective/Functional Measures: Re-evaluation was performed  Other Observations:  pes planus bilaterally  Palpation: tenderness to mid belly/musculotendinous junction of medial hamstring     Bilateral Knee AROM WNL       Ankle ROM:                            AROM                                                               R                      L                        DF--straight leg           2 degrees        6 degrees                                                             Flexibility: 90/90 Hamstring flexibility--lacking 25 degrees, 2/10 pain        LOWER QUARTER                            MUSCLE STRENGTH  KEY                                                                             R                      L  0 - No Contraction                   Knee ext                      5/5                   5/5  1 - Trace                                           flex                     4/5                   5/5  2 - Poor                                   Hip ext                         4/5                   5/5  3 - Fair                                           flex                         5/5                   5/5  4 - Good                                        abd                        4/5                   5/5  5 - Normal                                     add                        5/5                   5/5                                                  Ankle DF                     5/5                   5/5                                                            PF                     5/5                   5/5                                                              Glute max 4/5 bilaterally   Quadrant test: all directions 5/5, posterior tib (PF + IV) had increase in pain  Demonstrates difficulty controlling IV eccentrically       Special Tests:                             H.S. SLR: 25 R, 20 L                                            Pain Level (0-10 scale) post treatment: 0/10    Treatment was performed with direct supervision by Juan Thompson, PT,  DPT. ASSESSMENT/Changes in Function:   Pt is a 33 y.o. male who has been seen in PT since 11/28/2022 for complaints of anterior ankle pain. He has since had increased complaints of pain in the R hamstring beginning ~3 weeks ago. Pt demonstrates increased ankle and hamstring pain, decreased hamstring and gastroc flexibility, decreased ankle DF ROM, and decreased hamstring and hip strength, impacting his ability ambulate in the community and run. Pt would benefit from skilled PT to address deficits and new onset hamstring pain, progressing POC per pt tolerance. []  See Plan of Care  [x]  See progress note/recertification  []  See Discharge Summary         Progress towards goals / Updated goals:  Short and Long Term Goals:   Goals for this certification period to be accomplished in 3 weeks:  Pt will reduce hamstring pain to 0/10 to improve QOL and facilitate return to PLOF. Pt will improve gastroc flexibility by 5 degrees on R to improve foot biomechanics and decrease pain. Goals for this certification period to be accomplished in 6 weeks:  Pt will increase hamstring and global hip strength to 5/5 with no pain to improve strength and prepare pt for return to running. Pt will be able to run at least 2 miles without increase in ankle/hamstring pain. Pt will improve hamstring flexibility by 5 degrees to improve tissue extensibility and reduce strain. Pt will be able to run, cut, and jump without pain to facilitate safe return to basketball     Frequency / Duration: Patient to be seen 2 times per week for 4-6 weeks.     PLAN  [x]  Upgrade activities as tolerated     [x]  Continue plan of care  []  Update interventions per flow sheet       [] Discharge due to:  []  Other:_      Philip Vazquez, SPT 1/16/2023

## 2023-01-24 ENCOUNTER — HOSPITAL ENCOUNTER (OUTPATIENT)
Dept: PHYSICAL THERAPY | Age: 33
End: 2023-01-24
Payer: COMMERCIAL

## 2023-01-26 ENCOUNTER — HOSPITAL ENCOUNTER (OUTPATIENT)
Dept: PHYSICAL THERAPY | Age: 33
Discharge: HOME | End: 2023-01-26
Payer: COMMERCIAL

## 2023-01-26 PROCEDURE — 97110 THERAPEUTIC EXERCISES: CPT | Performed by: PHYSICAL THERAPIST

## 2023-01-26 PROCEDURE — 97140 MANUAL THERAPY 1/> REGIONS: CPT | Performed by: PHYSICAL THERAPIST

## 2023-01-26 NOTE — PROGRESS NOTES
PT DAILY TREATMENT NOTE - [facility] 2-15    Patient Name: Jimmy Spray  Date:2023  : 1990  [x]  Patient  Verified  Payor: BLUE CROSS / Plan: [id number] PPO / Product Type: PPO /    In time: 10:28 am  Out time: 11:08 am  Total Treatment Time (min): 30  Total Timed Codes (min): 25  1:1 Treatment Time ( only): 25  Visit #:  9    Treatment Area: Other enthesopathies, not elsewhere classified [M77.8]  Ganglion, unspecified site [M67.40]  Pain in right foot [M79.671]  Pain in left foot [M79.672]    SUBJECTIVE  Pain Level (0-10 scale): 2.5/10 (ankle and hamstring)  Any medication changes, allergies to medications, adverse drug reactions, diagnosis change, or new procedure performed?: [x] No    [] Yes (see summary sheet for update)  Subjective functional status/changes:   [] No changes reported  Reports he played basketball and that he didn't have any increase in pain. Thinks that some of his pain today may be psychological because was thinking about his ankle. OBJECTIVE    20 min Therapeutic Exercise:  [x] See flow sheet :    Rationale: increase ROM, increase strength, improve coordination, improve balance, and increase proprioception to improve the patients ability to return to running with no pain. 10 min Manual Therapy: STM to hamstrings, gentle IASTM to hamstrings, posterior talocrural mob with distraction grade 4, posterior distal fibula mobs grade 4    Rationale: decrease pain, increase ROM, and increase tissue extensibility to improve the patients ability to DF ankle and reduce hamstring pain.         With   [] TE   [] TA   [] Neuro   [] SC   [] other: Patient Education: [x] Review HEP    [] Progressed/Changed HEP based on:   [] positioning   [] body mechanics   [] transfers   [x] heat/ice application    [x] other: Education regarding hamstring and ankle injury, purchase dynadisc to simulate BAPS, education regarding brace possibly increasing pain     Other Objective/Functional Measures: FOTO score-- ***    Pain Level (0-10 scale) post treatment: 1.5/10    Treatment was performed with direct supervision by José Miguel Lopez, PT,  DPT. ASSESSMENT/Changes in Function:   Pt arrived ~13 minutes late; therefore, skilled PT was limited. Session focused on improving dorsiflexion at the ankle joint to decrease pronation compensation and improve form and improving activation and strengthening of hamstring. Performed mobilization to the ankle and then utilized step ups and lateral eccentric step downs to utilize ROM gained, requiring moderate cuing to decrease pronation. Pt demonstrates decreased stability with bent knee bridges on the ball and increased weakness during bilateral RDLs, suggestive of decreased posterior chain strength. Educated on possibility of DOMS following session due to increase in hamstring strengthening exercise today. Patient will continue to benefit from skilled PT services to modify and progress therapeutic interventions, address functional mobility deficits, address ROM deficits, address strength deficits, analyze and address soft tissue restrictions, analyze and cue movement patterns, analyze and modify body mechanics/ergonomics, and assess and modify postural abnormalities to attain remaining goals. [x]  See Plan of Care  []  See progress note/recertification  []  See Discharge Summary         Progress towards goals / Updated goals:  Short and Long Term Goals:   Goals for this certification period to be accomplished in 3 weeks:  Pt will reduce hamstring pain to 0/10 to improve QOL and facilitate return to PLOF. Pt will improve gastroc flexibility by 5 degrees on R to improve foot biomechanics and decrease pain. Goals for this certification period to be accomplished in 6 weeks:  Pt will increase hamstring and global hip strength to 5/5 with no pain to improve strength and prepare pt for return to running.   Pt will be able to run at least 2 miles without increase in ankle/hamstring pain. Pt will improve hamstring flexibility by 5 degrees to improve tissue extensibility and reduce strain. Pt will be able to run, cut, and jump without pain to facilitate safe return to basketball     Frequency / Duration: Patient to be seen 2 times per week for 4-6 weeks.     PLAN  [x]  Upgrade activities as tolerated     [x]  Continue plan of care  []  Update interventions per flow sheet       []  Discharge due to:  []  Other:_      Dimitris Parr, SPT 1/26/2023

## 2023-01-31 ENCOUNTER — HOSPITAL ENCOUNTER (OUTPATIENT)
Dept: PHYSICAL THERAPY | Age: 33
Discharge: HOME | End: 2023-01-31
Payer: COMMERCIAL

## 2023-01-31 PROCEDURE — 97112 NEUROMUSCULAR REEDUCATION: CPT | Performed by: PHYSICAL THERAPIST

## 2023-01-31 PROCEDURE — 97110 THERAPEUTIC EXERCISES: CPT | Performed by: PHYSICAL THERAPIST

## 2023-01-31 PROCEDURE — 97140 MANUAL THERAPY 1/> REGIONS: CPT | Performed by: PHYSICAL THERAPIST

## 2023-01-31 NOTE — PROGRESS NOTES
PT DAILY TREATMENT NOTE - [facility] 2-15    Patient Name: Shakeel Pettit  XGHS:[id number]  : 1990  [x]  Patient  Verified  Payor: BLUE CROSS / Plan: [id number] PPO / Product Type: PPO /    In time: 1:15P Out time: 2:20p  Total Treatment Time (min): 65  Total Timed Codes (min): 65  1:1 Treatment Time ( [address] only): 60  Visit #:  10    Treatment Area: Other enthesopathies, not elsewhere classified [M77.8]  Ganglion, unspecified site [M67.40]  Pain in right foot [M79.671]  Pain in left foot [M79.672]    SUBJECTIVE  Pain Level (0-10 scale): /10  Any medication changes, allergies to medications, adverse drug reactions, diagnosis change, or new procedure performed?: [x] No    [] Yes (see summary sheet for update)  Subjective functional status/changes:   [] No changes reported  The pt reports no pain and minimal soreness noted following last session. OBJECTIVE    30 min Therapeutic Exercise:  [x] See flow sheet : Updated HEP--instructed to perform eccentric prone HS curl, 4-way ankle, hamstring isometrics with knees bent, and bridges    Rationale: increase ROM, increase strength, improve coordination, improve balance, and increase proprioception to improve the patients ability to return to running with no pain. 15 min Neuromuscular Re-education: balance and proprioception exercises as outlined on flow sheet. SLS, RNT TG, and eccentric touchdowns with visual and tactile cues. Rationale: increase ROM, increase strength, improve coordination, improve balance, and increase proprioception to improve the patients ability to return to running with no pain. 20 min Manual Interventions: STM to right HS, talocrural joint mobilizations, posterior distal fibular mobilizations, STM to tib anterior. 1st MTP PROM grade 3-4 mobilizations to increased Flexion.     Rationale: increase ROM, increase strength, improve coordination, improve balance, and increase proprioception to improve the patients ability to return to running with no pain. With   [] TE   [] TA   [] Neuro   [] SC   [] other: Patient Education: [x] Review HEP    [] Progressed/Changed HEP based on:   [] positioning   [] body mechanics   [] transfers   [x] heat/ice application    [x] other: Education regarding hamstring and ankle injury, purchase dynadisc to simulate BAPS, education regarding brace possibly increasing pain     Other Objective/Functional Measures: Re-evaluation was performed  Pain Level (0-10 scale) post treatment: 0/10      ASSESSMENT/Changes in Function:   Pt tolerated progression of exercises well, able to increase resistance of isometrics and added HS to SWB bridges. Increased reps of hip hinge. No pain reported. Patient will continue to benefit from skilled PT services to modify and progress therapeutic interventions, address functional mobility deficits, address ROM deficits, address strength deficits, analyze and address soft tissue restrictions, analyze and cue movement patterns, analyze and modify body mechanics/ergonomics, assess and modify postural abnormalities, and instruct in home and community integration to attain remaining goals. [x]  See Plan of Care  []  See progress note/recertification  []  See Discharge Summary         Progress towards goals / Updated goals:  Short and Long Term Goals:   Goals for this certification period to be accomplished in 3 weeks:  Pt will reduce hamstring pain to 0/10 to improve QOL and facilitate return to PLOF. Pt will improve gastroc flexibility by 5 degrees on R to improve foot biomechanics and decrease pain. Goals for this certification period to be accomplished in 6 weeks:  Pt will increase hamstring and global hip strength to 5/5 with no pain to improve strength and prepare pt for return to running. Pt will be able to run at least 2 miles without increase in ankle/hamstring pain.    Pt will improve hamstring flexibility by 5 degrees to improve tissue extensibility and reduce strain. Pt will be able to run, cut, and jump without pain to facilitate safe return to basketball     Frequency / Duration: Patient to be seen 2 times per week for 4-6 weeks.     PLAN  [x]  Upgrade activities as tolerated     [x]  Continue plan of care  []  Update interventions per flow sheet       []  Discharge due to:  []  Other:_      Kanika Calzada, PT, DPT 1/31/2023

## 2023-02-02 ENCOUNTER — HOSPITAL ENCOUNTER (OUTPATIENT)
Dept: PHYSICAL THERAPY | Age: 33
Discharge: HOME | End: 2023-02-02
Payer: COMMERCIAL

## 2023-02-02 PROCEDURE — 97112 NEUROMUSCULAR REEDUCATION: CPT | Performed by: PHYSICAL THERAPIST

## 2023-02-02 PROCEDURE — 97016 VASOPNEUMATIC DEVICE THERAPY: CPT | Performed by: PHYSICAL THERAPIST

## 2023-02-02 PROCEDURE — 97140 MANUAL THERAPY 1/> REGIONS: CPT | Performed by: PHYSICAL THERAPIST

## 2023-02-02 PROCEDURE — 97110 THERAPEUTIC EXERCISES: CPT | Performed by: PHYSICAL THERAPIST

## 2023-02-02 NOTE — PROGRESS NOTES
PT DAILY TREATMENT NOTE - [facility] 2-15    Patient Name: Demi Hernandez  AVMJ:[id number]  : 1990  [x]  Patient  Verified  Payor: BLUE CROSS / Plan: [id number] PPO / Product Type: PPO /    In time: 10:15A Out time: 11:30A  Total Treatment Time (min): 75  Total Timed Codes (min): 65  1:1 Treatment Time ( [address] only): 60  Visit #:  11    Treatment Area: Other enthesopathies, not elsewhere classified [M77.8]  Ganglion, unspecified site [M67.40]  Pain in right foot [M79.671]  Pain in left foot [M79.672]    SUBJECTIVE  Pain Level (0-10 scale): 5/10 right foot 2/10 right HS  Any medication changes, allergies to medications, adverse drug reactions, diagnosis change, or new procedure performed?: [x] No    [] Yes (see summary sheet for update)  Subjective functional status/changes:   [] No changes reported  The pt reports he had a flare up in his foot Tuesday afternoon. Unsure why, but wore different shoes that morning. OBJECTIVE           Modality rationale: decrease inflammation and decrease pain to improve the patients ability to return to running pain free.      Min Type Additional Details        [] Estim: []Att   []Unatt    []TENS instruct                  []IFC  []Premod   []NMES                     []Other:  []w/US   []w/ice   []w/heat  Position:  Location:        []  Traction: [] Cervical       []Lumbar                       [] Prone          []Supine                       []Intermittent   []Continuous Lbs:  [] before manual  [] after manual  []w/heat     []  Ultrasound: []Continuous   [] Pulsed                       at: []1MHz   []3MHz Location:  W/cm2:     [] Paraffin         Location:   []w/heat    []  Ice     []  Heat  []  Ice massage Position:  Location:     []  Laser  []  Other: Position:  Location:       10 [x]  Vasopneumatic Device Pressure:       [] lo [] med [x] hi   Temperature: 34      [x] Skin assessment post-treatment:  [x]intact []redness- no adverse reaction    []redness - adverse reaction:   30 min Therapeutic Exercise:  [x] See flow sheet : Updated HEP--instructed to perform eccentric prone HS curl, 4-way ankle, hamstring isometrics with knees bent, and bridges    Rationale: increase ROM, increase strength, improve coordination, improve balance, and increase proprioception to improve the patients ability to return to running with no pain. 15 min Neuromuscular Re-education: balance and proprioception exercises as outlined on flow sheet. SLS, RNT TG, and eccentric touchdowns with visual and tactile cues. Rationale: increase ROM, increase strength, improve coordination, improve balance, and increase proprioception to improve the patients ability to return to running with no pain. 20 min Manual Interventions: STM to right HS, talocrural joint mobilizations, posterior distal fibular mobilizations, STM to tib anterior. 1st MTP PROM grade 3-4 mobilizations to increased Flexion. Rationale: increase ROM, increase strength, improve coordination, improve balance, and increase proprioception to improve the patients ability to return to running with no pain. With   [] TE   [] TA   [] Neuro   [] SC   [] other: Patient Education: [x] Review HEP    [] Progressed/Changed HEP based on:   [] positioning   [] body mechanics   [] transfers   [x] heat/ice application    [x] other: Education regarding hamstring and ankle injury, purchase dynadisc to simulate BAPS, education regarding brace possibly increasing pain     Other Objective/Functional Measures: Re-evaluation was performed  Pain Level (0-10 scale) post treatment: 0/10      ASSESSMENT/Changes in Function:   Performed balance in his shoes today. Pt may have had increased pain from being out of shoes. Still has tenderness along talar dome and Extensor hallicus longus tendon. Encouraged to ice 2x per day. ABle to progress HS strength.  Patient will continue to benefit from skilled PT services to modify and progress therapeutic interventions, address functional mobility deficits, address ROM deficits, address strength deficits, analyze and address soft tissue restrictions, analyze and cue movement patterns, analyze and modify body mechanics/ergonomics, assess and modify postural abnormalities, and instruct in home and community integration to attain remaining goals. [x]  See Plan of Care  []  See progress note/recertification  []  See Discharge Summary         Progress towards goals / Updated goals:  Short and Long Term Goals:   Goals for this certification period to be accomplished in 3 weeks:  Pt will reduce hamstring pain to 0/10 to improve QOL and facilitate return to PLOF. Pt will improve gastroc flexibility by 5 degrees on R to improve foot biomechanics and decrease pain. Goals for this certification period to be accomplished in 6 weeks:  Pt will increase hamstring and global hip strength to 5/5 with no pain to improve strength and prepare pt for return to running. Pt will be able to run at least 2 miles without increase in ankle/hamstring pain. Pt will improve hamstring flexibility by 5 degrees to improve tissue extensibility and reduce strain. Pt will be able to run, cut, and jump without pain to facilitate safe return to basketball     Frequency / Duration: Patient to be seen 2 times per week for 4-6 weeks.     PLAN  [x]  Upgrade activities as tolerated     [x]  Continue plan of care  []  Update interventions per flow sheet       []  Discharge due to:  []  Other:_      Lucrecia Malone, PT, DPT 2/2/2023

## 2023-02-06 ENCOUNTER — HOSPITAL ENCOUNTER (OUTPATIENT)
Dept: PHYSICAL THERAPY | Age: 33
Discharge: HOME | End: 2023-02-06
Payer: COMMERCIAL

## 2023-02-06 PROCEDURE — 97140 MANUAL THERAPY 1/> REGIONS: CPT | Performed by: PHYSICAL THERAPIST

## 2023-02-06 PROCEDURE — 97112 NEUROMUSCULAR REEDUCATION: CPT | Performed by: PHYSICAL THERAPIST

## 2023-02-06 PROCEDURE — 97110 THERAPEUTIC EXERCISES: CPT | Performed by: PHYSICAL THERAPIST

## 2023-02-06 NOTE — PROGRESS NOTES
PT DAILY TREATMENT NOTE - [facility] 2-15    Patient Name: Tirso Armas  Date:2023  : 1990  [x]  Patient  Verified  Payor: BLUE CROSS / Plan: Channing VALENTINE Ritesh [id number] PPO / Product Type: PPO /    In time: 8:38A Out time: 9:36A  Total Treatment Time (min): 58  Total Timed Codes (min): 58  1:1 Treatment Time ([facility] only): 50  Visit #:  12    Treatment Area: Other enthesopathies, not elsewhere classified [M77.8]  Ganglion, unspecified site [M67.40]  Pain in right foot [M79.671]  Pain in left foot [M79.672]    SUBJECTIVE  Pain Level (0-10 scale): 3/10 HS and foot  Any medication changes, allergies to medications, adverse drug reactions, diagnosis change, or new procedure performed?: [x] No    [] Yes (see summary sheet for update)  Subjective functional status/changes:   [] No changes reported  The pt reports doing better in the foot after last session. HS was a little sore following pushing harder on the bike last time. OBJECTIVE      23 min Therapeutic Exercise:  [x] See flow sheet : Updated HEP--instructed to perform eccentric prone HS curl, 4-way ankle, hamstring isometrics with knees bent, and bridges    Rationale: increase ROM, increase strength, improve coordination, improve balance, and increase proprioception to improve the patients ability to return to running with no pain. 15 min Neuromuscular Re-education: balance and proprioception exercises as outlined on flow sheet. SLS, RNT TG, and eccentric touchdowns with visual and tactile cues. Rationale: increase ROM, increase strength, improve coordination, improve balance, and increase proprioception to improve the patients ability to return to running with no pain. 20 min Manual Interventions: STM to right HS, talocrural joint mobilizations, posterior distal fibular mobilizations, STM to tib anterior. 1st MTP PROM grade 3-4 mobilizations to increased Flexion.     Rationale: increase ROM, increase strength, improve coordination, improve balance, and increase proprioception to improve the patients ability to return to running with no pain. With   [] TE   [] TA   [] Neuro   [] SC   [] other: Patient Education: [x] Review HEP    [] Progressed/Changed HEP based on:   [] positioning   [] body mechanics   [] transfers   [x] heat/ice application    [x] other: Education regarding hamstring and ankle injury, purchase dynadisc to simulate BAPS, education regarding brace possibly increasing pain     Other Objective/Functional Measures: -  Pain Level (0-10 scale) post treatment: 2/10      ASSESSMENT/Changes in Function:   Pt was challenged with resisted HS curls with manual holding of the band. No pain reported during any exercises. Challenged with control of the [employer] today as well. Will continue to address impairments as tolerated. Patient will continue to benefit from skilled PT services to modify and progress therapeutic interventions, address functional mobility deficits, address ROM deficits, address strength deficits, analyze and address soft tissue restrictions, analyze and cue movement patterns, analyze and modify body mechanics/ergonomics, assess and modify postural abnormalities, and instruct in home and community integration to attain remaining goals. [x]  See Plan of Care  []  See progress note/recertification  []  See Discharge Summary         Progress towards goals / Updated goals:  Short and Long Term Goals:   Goals for this certification period to be accomplished in 3 weeks:  Pt will reduce hamstring pain to 0/10 to improve QOL and facilitate return to PLOF. Pt will improve gastroc flexibility by 5 degrees on R to improve foot biomechanics and decrease pain. Goals for this certification period to be accomplished in 6 weeks:  Pt will increase hamstring and global hip strength to 5/5 with no pain to improve strength and prepare pt for return to running.   Pt will be able to run at least 2 miles without increase in ankle/hamstring pain. Pt will improve hamstring flexibility by 5 degrees to improve tissue extensibility and reduce strain. Pt will be able to run, cut, and jump without pain to facilitate safe return to basketball     Frequency / Duration: Patient to be seen 2 times per week for 4-6 weeks.     PLAN  [x]  Upgrade activities as tolerated     [x]  Continue plan of care  []  Update interventions per flow sheet       []  Discharge due to:  []  Other:_      Adria Banuelos, PT, DPT 2/6/2023

## 2023-02-08 ENCOUNTER — HOSPITAL ENCOUNTER (OUTPATIENT)
Dept: PHYSICAL THERAPY | Age: 33
Discharge: HOME | End: 2023-02-08
Payer: COMMERCIAL

## 2023-02-08 PROCEDURE — 97140 MANUAL THERAPY 1/> REGIONS: CPT | Performed by: PHYSICAL THERAPIST

## 2023-02-08 PROCEDURE — 97112 NEUROMUSCULAR REEDUCATION: CPT | Performed by: PHYSICAL THERAPIST

## 2023-02-08 PROCEDURE — 97110 THERAPEUTIC EXERCISES: CPT | Performed by: PHYSICAL THERAPIST

## 2023-02-08 PROCEDURE — 97016 VASOPNEUMATIC DEVICE THERAPY: CPT | Performed by: PHYSICAL THERAPIST

## 2023-02-15 ENCOUNTER — APPOINTMENT (OUTPATIENT)
Dept: PHYSICAL THERAPY | Age: 33
End: 2023-02-15
Payer: COMMERCIAL

## 2023-02-17 ENCOUNTER — HOSPITAL ENCOUNTER (OUTPATIENT)
Dept: PHYSICAL THERAPY | Age: 33
Discharge: HOME | End: 2023-02-17
Payer: COMMERCIAL

## 2023-02-17 PROCEDURE — 97140 MANUAL THERAPY 1/> REGIONS: CPT | Performed by: PHYSICAL THERAPIST

## 2023-02-17 PROCEDURE — 97116 GAIT TRAINING THERAPY: CPT | Performed by: PHYSICAL THERAPIST

## 2023-02-17 PROCEDURE — 97112 NEUROMUSCULAR REEDUCATION: CPT | Performed by: PHYSICAL THERAPIST

## 2023-02-17 PROCEDURE — 97110 THERAPEUTIC EXERCISES: CPT | Performed by: PHYSICAL THERAPIST

## 2023-02-17 PROCEDURE — 97016 VASOPNEUMATIC DEVICE THERAPY: CPT | Performed by: PHYSICAL THERAPIST

## 2023-02-17 NOTE — PROGRESS NOTES
PT DAILY TREATMENT NOTE - [facility] 2-15    Patient Name: Carmelina Fagan  Date:2023  : 1990  [x]  Patient  Verified  Payor: BLUE LALITHA / Plan: Channing Awad [id number] PPO / Product Type: PPO /    In time: 7:09A Out time: 8:29A  Total Treatment Time (min): 81  Total Timed Codes (min): 71  1:1 Treatment Time ( [address] only): 66  Visit #:  14    Treatment Area: Other enthesopathies, not elsewhere classified [M77.8]  Ganglion, unspecified site [M67.40]  Pain in right foot [M79.671]  Pain in left foot [M79.672]    SUBJECTIVE  Pain Level (0-10 scale): 3/10 HS, 2/10 foot  Any medication changes, allergies to medications, adverse drug reactions, diagnosis change, or new procedure performed?: [x] No    [] Yes (see summary sheet for update)  Subjective functional status/changes:   [] No changes reported  Reports lingering hamstring pain and some minor ankle pain but states he's able to do all his I/ADLs without issue. States he went to the gym on Tuesday for legs/hamstrings. Denies doing anything to increase either hamstring or ankle pain.        OBJECTIVE  Modality rationale: decrease inflammation and decrease pain to improve the patients ability to manage pain and return to PLOF   Min Type Additional Details       [] Estim: []Att   []Unatt    []TENS instruct                  []IFC  []Premod   []NMES                     []Other:  []w/US   []w/ice   []w/heat  Position:  Location:       []  Traction: [] Cervical       []Lumbar                       [] Prone          []Supine                       []Intermittent   []Continuous Lbs:  [] before manual  [] after manual  []w/heat    []  Ultrasound: []Continuous   [] Pulsed                       at: []1MHz   []3MHz Location:  W/cm2:    [] Paraffin         Location:   []w/heat   10 [x]  Ice     []  Heat  []  Ice massage Position: reclined  Location: anterior ankle    []  Laser  []  Other: Position:  Location:   10   [x]  Vasopneumatic Device Pressure:       [] lo [x] med [] hi   Temperature: 34     [x] Skin assessment post-treatment:  [x]intact []redness- no adverse reaction    []redness - adverse reaction:       16 min Therapeutic Exercise:  [x] See flow sheet : Updated HEP--instructed to perform eccentric prone HS curl, 4-way ankle, hamstring isometrics with knees bent, and bridges    Rationale: increase ROM, increase strength, improve coordination, improve balance, and increase proprioception to improve the patients ability to return to running with no pain. 20 min Neuromuscular Re-education: balance and proprioception exercises as outlined on flow sheet. SLS, RNT TG, and eccentric touchdowns with visual and tactile cues. Rationale: increase ROM, increase strength, improve coordination, improve balance, and increase proprioception to improve the patients ability to return to running with no pain. 20 min Manual Interventions: STM to right HS, talocrural joint mobilizations, posterior distal fibular mobilizations, STM to tib anterior. 1st MTP PROM grade 3-4 mobilizations to increased Flexion. Rationale: increase ROM, increase strength, improve coordination, improve balance, and increase proprioception to improve the patients ability to return to running with no pain. 15 min Gait Training:  Treadmill video gait analysis using dart fish software system    Rationale: increase ROM, increase strength, improve coordination, improve balance, and increase proprioception  to improve the patients ability to return to run pain free. With   [] TE   [] TA   [] Neuro   [] SC   [] other: Patient Education: [x] Review HEP    [] Progressed/Changed HEP based on:   [] positioning   [] body mechanics   [] transfers   [x] heat/ice application    [x] other: Education on sleep and nutrition on muscle recovery     Other Objective/Functional Measures:  Video Gait analysis: Pt is a mid foot striker.  Has increased max pronation measured at 12 degrees right, Greater excursion on the left due to initial contact rearfoot angle. Pelvic drom right stand was 7.4 degrees left 6.9 degrees. Vaishali 160, left he has decreased knee flexion at intial contact and is overstriding. THis is asymmetrical. Right side he lands in 25 degrees of knee flexion. He is lacking ankle PF at push off and hip drive during swing. Pain Level (0-10 scale) post treatment: 0/10      ASSESSMENT/Changes in Function:   Pt continues to report lingering hamstring pain; therefore, initiated session with STM/MFR to hamstring, with palpable tightness throughout the muscle and improvement in symptoms following. Had pt perform RDL with dowel for eccentric hamstring control and improve glute activation, with pt reporting fatigue following. Pt is better able to perform bilateral heel raises with improved form compared to last session; however, did need cue to not invert ankles as reached end of range. Performed running analysis to assess form and areas of focus for rehabilitation. Patient will continue to benefit from skilled PT services to modify and progress therapeutic interventions, address functional mobility deficits, address ROM deficits, address strength deficits, analyze and address soft tissue restrictions, analyze and cue movement patterns, analyze and modify body mechanics/ergonomics, assess and modify postural abnormalities, and instruct in home and community integration to attain remaining goals. [x]  See Plan of Care  []  See progress note/recertification  []  See Discharge Summary         Progress towards goals / Updated goals:  Short and Long Term Goals:   Goals for this certification period to be accomplished in 3 weeks:  Pt will reduce hamstring pain to 0/10 to improve QOL and facilitate return to PLOF. Pt will improve gastroc flexibility by 5 degrees on R to improve foot biomechanics and decrease pain.       Goals for this certification period to be accomplished in 6 weeks:  Pt will increase hamstring and global hip strength to 5/5 with no pain to improve strength and prepare pt for return to running. Pt will be able to run at least 2 miles without increase in ankle/hamstring pain. Pt will improve hamstring flexibility by 5 degrees to improve tissue extensibility and reduce strain. Pt will be able to run, cut, and jump without pain to facilitate safe return to basketball     Frequency / Duration: Patient to be seen 2 times per week for 4-6 weeks.     PLAN  [x]  Upgrade activities as tolerated     [x]  Continue plan of care  []  Update interventions per flow sheet       []  Discharge due to:  []  Other:_      Mandeep Torres, FRANSISCO 2/17/2023

## 2023-02-20 ENCOUNTER — HOSPITAL ENCOUNTER (OUTPATIENT)
Dept: PHYSICAL THERAPY | Age: 33
Discharge: HOME | End: 2023-02-20
Payer: COMMERCIAL

## 2023-02-20 PROCEDURE — 97140 MANUAL THERAPY 1/> REGIONS: CPT | Performed by: PHYSICAL THERAPIST

## 2023-02-20 PROCEDURE — 97110 THERAPEUTIC EXERCISES: CPT | Performed by: PHYSICAL THERAPIST

## 2023-02-20 PROCEDURE — 97112 NEUROMUSCULAR REEDUCATION: CPT | Performed by: PHYSICAL THERAPIST

## 2023-02-20 PROCEDURE — 97016 VASOPNEUMATIC DEVICE THERAPY: CPT | Performed by: PHYSICAL THERAPIST

## 2023-02-20 PROCEDURE — 97116 GAIT TRAINING THERAPY: CPT | Performed by: PHYSICAL THERAPIST

## 2023-02-20 NOTE — PROGRESS NOTES
PT DAILY TREATMENT NOTE - [facility] -15    Patient Name: Ruben Shen  Date:2023  : 1990  [x]  Patient  Verified  Payor: BLUE CROSS / Plan: Channing Awad [id number] PPO / Product Type: PPO /    In time: 11:01A Out time: 12:01A  Total Treatment Time (min): 60  Total Timed Codes (min): 50  1:1 Treatment Time ( only): 45  Visit #:  15    Treatment Area: Other enthesopathies, not elsewhere classified [M77.8]  Ganglion, unspecified site [M67.40]  Pain in right foot [M79.671]  Pain in left foot [M79.672]    SUBJECTIVE  Pain Level (0-10 scale): 2/10 HS, 2/10 foot  Any medication changes, allergies to medications, adverse drug reactions, diagnosis change, or new procedure performed?: [x] No    [] Yes (see summary sheet for update)  Subjective functional status/changes:   [] No changes reported  Reports minor pain, had some pain following last visit, but was gone by the next day. Pt reports he has been icing.        OBJECTIVE  Modality rationale: decrease inflammation and decrease pain to improve the patients ability to manage pain and return to PLOF   Min Type Additional Details       [] Estim: []Att   []Unatt    []TENS instruct                  []IFC  []Premod   []NMES                     []Other:  []w/US   []w/ice   []w/heat  Position:  Location:       []  Traction: [] Cervical       []Lumbar                       [] Prone          []Supine                       []Intermittent   []Continuous Lbs:  [] before manual  [] after manual  []w/heat    []  Ultrasound: []Continuous   [] Pulsed                       at: []1MHz   []3MHz Location:  W/cm2:    [] Paraffin         Location:   []w/heat   10 [x]  Ice     []  Heat  []  Ice massage Position: reclined  Location: anterior ankle    []  Laser  []  Other: Position:  Location:   10   [x]  Vasopneumatic Device Pressure:       [] lo [x] med [] hi   Temperature: 34     [x] Skin assessment post-treatment:  [x]intact []redness- no adverse reaction    []redness - adverse reaction:       10 min Therapeutic Exercise:  [x] See flow sheet : Updated HEP--instructed to perform eccentric prone HS curl, 4-way ankle, hamstring isometrics with knees bent, and bridges    Rationale: increase ROM, increase strength, improve coordination, improve balance, and increase proprioception to improve the patients ability to return to running with no pain. 15 min Neuromuscular Re-education: balance and proprioception exercises as outlined on flow sheet. SLS, RNT TG, and eccentric touchdowns with visual and tactile cues. Rationale: increase ROM, increase strength, improve coordination, improve balance, and increase proprioception to improve the patients ability to return to running with no pain. 10 min Manual Interventions: STM to right HS   Rationale: increase ROM, increase strength, improve coordination, improve balance, and increase proprioception to improve the patients ability to return to running with no pain. 15 min Gait Training:  Treadmill video gait analysis using dart fish software system    Rationale: increase ROM, increase strength, improve coordination, improve balance, and increase proprioception  to improve the patients ability to return to run pain free. With   [] TE   [] TA   [] Neuro   [] SC   [] other: Patient Education: [x] Review HEP    [] Progressed/Changed HEP based on:   [] positioning   [] body mechanics   [] transfers   [] heat/ice application    [] other:      Other Objective/Functional Measures: CKC DF-- L Before exercise: 1 inch, after exercise: 2 inch. R before exercise: 0 inch, after: 1.5 in  Video Gait analysis: Pt is a mid foot striker. Has increased max pronation measured at 12 degrees right, Greater excursion on the left due to initial contact rearfoot angle. Pelvic drom right stand was 7.4 degrees left 6.9 degrees. Vaishali 160, left he has decreased knee flexion at intial contact and is overstriding.  THis is asymmetrical. Right side he lands in 25 degrees of knee flexion. He is lacking ankle PF at push off and hip drive during swing. Pain Level (0-10 scale) post treatment: not assessed today      ASSESSMENT/Changes in Function:   Discussed video analysis of running gait today. Following video analysis, performed heisman exercise to improve triple extension in running form, with pt requiring min cuing for fully extending knee, to improve knee alignment over toes, and to activate gluteal musculature. Introduced side planks to address obliques and lateral musculature to improve form while running, particularly on R side as tendency to have decreased stability compared to contralateral side. Performed 1/2 kneeling DF to improve ROM and improve mechanics through foot while running, with pt able to increase ROM. Recommended pt perform exercise every day for lasting changes to CKC ROM. Patient will continue to benefit from skilled PT services to modify and progress therapeutic interventions, address functional mobility deficits, address ROM deficits, address strength deficits, analyze and address soft tissue restrictions, analyze and cue movement patterns, analyze and modify body mechanics/ergonomics, assess and modify postural abnormalities, and instruct in home and community integration to attain remaining goals. Treatment was performed with direct supervision by Patt Parra, PT,  DPT. [x]  See Plan of Care  []  See progress note/recertification  []  See Discharge Summary         Progress towards goals / Updated goals:  Short and Long Term Goals:   Goals for this certification period to be accomplished in 3 weeks:  Pt will reduce hamstring pain to 0/10 to improve QOL and facilitate return to PLOF. Pt will improve gastroc flexibility by 5 degrees on R to improve foot biomechanics and decrease pain.       Goals for this certification period to be accomplished in 6 weeks:  Pt will increase hamstring and global hip strength to 5/5 with no pain to improve strength and prepare pt for return to running. Pt will be able to run at least 2 miles without increase in ankle/hamstring pain. Pt will improve hamstring flexibility by 5 degrees to improve tissue extensibility and reduce strain. Pt will be able to run, cut, and jump without pain to facilitate safe return to basketball     Frequency / Duration: Patient to be seen 2 times per week for 4-6 weeks.     PLAN  [x]  Upgrade activities as tolerated     [x]  Continue plan of care  []  Update interventions per flow sheet       []  Discharge due to:  []  Other:_      Emmett Coy, SPT 2/20/2023

## 2023-02-22 ENCOUNTER — HOSPITAL ENCOUNTER (OUTPATIENT)
Dept: PHYSICAL THERAPY | Age: 33
Discharge: HOME | End: 2023-02-22
Payer: COMMERCIAL

## 2023-02-22 PROCEDURE — 97110 THERAPEUTIC EXERCISES: CPT | Performed by: PHYSICAL THERAPIST

## 2023-02-22 PROCEDURE — 97112 NEUROMUSCULAR REEDUCATION: CPT | Performed by: PHYSICAL THERAPIST

## 2023-02-22 PROCEDURE — 97140 MANUAL THERAPY 1/> REGIONS: CPT | Performed by: PHYSICAL THERAPIST

## 2023-02-22 NOTE — PROGRESS NOTES
PT DAILY TREATMENT NOTE - [facility] 2-15    Patient Name: Karyle Hoa  Date:2023  : 1990  [x]  Patient  Verified  Payor: BLUE CROSS / Plan: Channing Awad [id number] PPO / Product Type: PPO /    In time: 11:00A Out time: 11:53A  Total Treatment Time (min): 53  Total Timed Codes (min): 53  1:1 Treatment Time ( only): 48  Visit #:  16    Treatment Area: Other enthesopathies, not elsewhere classified [M77.8]  Ganglion, unspecified site [M67.40]  Pain in right foot [M79.671]  Pain in left foot [M79.672]    SUBJECTIVE  Pain Level (0-10 scale): 1-2/10  Any medication changes, allergies to medications, adverse drug reactions, diagnosis change, or new procedure performed?: [x] No    [] Yes (see summary sheet for update)  Subjective functional status/changes:   [] No changes reported  Reports some minor pain. Mainly only has pain while on bike. States did some basketball dribbling today and felt good. OBJECTIVE    18 min Therapeutic Exercise:  [x] See flow sheet : Updated HEP--instructed to perform eccentric prone HS curl, 4-way ankle, hamstring isometrics with knees bent, and bridges    Rationale: increase ROM, increase strength, improve coordination, improve balance, and increase proprioception to improve the patients ability to return to running with no pain. 20 min Neuromuscular Re-education: balance and proprioception exercises as outlined on flow sheet. SLS, RNT TG, and eccentric touchdowns with visual and tactile cues. Rationale: increase ROM, increase strength, improve coordination, improve balance, and increase proprioception to improve the patients ability to return to running with no pain. 15 min Manual Interventions: STM to right HS   Rationale: increase ROM, increase strength, improve coordination, improve balance, and increase proprioception to improve the patients ability to return to running with no pain.        With   [] TE   [] TA   [] Neuro   [] SC   [] other: Patient Education: [x] Review HEP    [] Progressed/Changed HEP based on:   [] positioning   [] body mechanics   [] transfers   [] heat/ice application    [] other:      Other Objective/Functional Measures: --  Pain Level (0-10 scale) post treatment: not assessed today    ASSESSMENT/Changes in Function:   Increased repetitions of bilateral RDL to increase eccentric hamstring strength, with pt demonstrating increased fatigue and decreased strength with increased repetitions. Continued to perform Heisman exercise and progression with knee drive and glute stability for improved form while running, with pt requiring cues for appropriate glute activation and to decrease excessive pronation. To improve control of pronation, initiated single leg pallof press with cues to maintain glute activation and to activate foot intrinsic musculature to decrease pronation. Plan to add single leg chop to promote improved rotational stability and ankle control. Patient will continue to benefit from skilled PT services to modify and progress therapeutic interventions, address functional mobility deficits, address ROM deficits, address strength deficits, analyze and address soft tissue restrictions, analyze and cue movement patterns, analyze and modify body mechanics/ergonomics, assess and modify postural abnormalities, and instruct in home and community integration to attain remaining goals. Treatment was performed with direct supervision by Fayette Sicard, PT,  DPT. [x]  See Plan of Care  []  See progress note/recertification  []  See Discharge Summary         Progress towards goals / Updated goals:  Short and Long Term Goals:   Goals for this certification period to be accomplished in 3 weeks:  Pt will reduce hamstring pain to 0/10 to improve QOL and facilitate return to PLOF. Pt will improve gastroc flexibility by 5 degrees on R to improve foot biomechanics and decrease pain.       Goals for this certification period to be accomplished in 6 weeks:  Pt will increase hamstring and global hip strength to 5/5 with no pain to improve strength and prepare pt for return to running. Pt will be able to run at least 2 miles without increase in ankle/hamstring pain. Pt will improve hamstring flexibility by 5 degrees to improve tissue extensibility and reduce strain. Pt will be able to run, cut, and jump without pain to facilitate safe return to basketball     Frequency / Duration: Patient to be seen 2 times per week for 4-6 weeks.     PLAN  [x]  Upgrade activities as tolerated     [x]  Continue plan of care  []  Update interventions per flow sheet       []  Discharge due to:  []  Other:_      FRANSISCO Damico 2/22/2023

## 2023-02-27 ENCOUNTER — VIRTUAL VISIT (OUTPATIENT)
Dept: INTERNAL MEDICINE CLINIC | Age: 33
End: 2023-02-27
Payer: COMMERCIAL

## 2023-02-27 DIAGNOSIS — J02.9 PHARYNGITIS, UNSPECIFIED ETIOLOGY: Primary | ICD-10-CM

## 2023-02-27 DIAGNOSIS — M77.8 EXTENSOR TENDONITIS OF FOOT: ICD-10-CM

## 2023-02-27 PROCEDURE — 99214 OFFICE O/P EST MOD 30 MIN: CPT | Performed by: FAMILY MEDICINE

## 2023-02-27 RX ORDER — METHYLPREDNISOLONE 4 MG/1
TABLET ORAL
Qty: 1 DOSE PACK | Refills: 0 | Status: SHIPPED | OUTPATIENT
Start: 2023-02-27

## 2023-02-27 NOTE — PROGRESS NOTES
Kelby Woodall is a 33 y.o. male who was seen by synchronous (real-time) audio-video technology on 2/27/2023 for URI        Assessment & Plan:   Diagnoses and all orders for this visit:    1. Pharyngitis, unspecified etiology  Recommend COVID testing and patient will MyChart message with results  2. Extensor tendonitis of foot  -     MRI FOOT RT WO CONT; Future  MRI due to failure of physical therapy  Other orders  -     methylPREDNISolone (MEDROL DOSEPACK) 4 mg tablet; Take as directed        Adults: For nasal congestion, cough and cold/flu symptoms I advised:    - Seek medical care if symptoms become more severe or if you develop      chest pain, shortness of breath, confusion.    - Contact us if your symptoms fail to improve after 7-10 days   - Rest as much as possible and stay home from work/school at least 24 hours                  after last fever              - Wash hand frequently and cough/sneeze into your sleeve to help prevent       infection of others   - Drink plenty of fluids   - Ibuprofen (Advil, Motrin) 400-800mg every 6 hours or                  Aleve 220 mg 1-2 pills every 8 hours for fever, headache, pain   - Tylenol extra strength 500 mg every 6 hours for pain, headache, fever   - Nasal saline rinses 2-3 times daily for nasal congestion   - Mucinex 1200 mg twice daily or Guaifenesin 400 mg every 4 hours for chest       congestion              - Robitussin DM or Delsym for cough(suppress cough and thin mucus.  )   - Cepacol throat lozenges and saline gargles (1 tsp salt in 8 oz water) for sore       throat   - Tea with honey for cough (buckwheat honey preferred)              - Benadryl (diphenhydramine) 50 mg at night for nasal congestion/allergies   - Pseudoephedrine 12-hour tablets twice daily for nasal and inner ear       -Ask your pharmacist (this is kept behind the counter)     -If you have high blood pressure or heart disease, use this        medication with caution (ask your doctor), alternative coricidin   - Afrin (oxymetazoline) nasal spray 2 sprays in each nostril twice daily for severe      congestion.       -Do not use this medication for more than 3 days as it may cause         \"rebound congestion\". -If you have high blood pressure or heart disease, use this medication       with caution (ask your doctor)      Children:   Sit in bathroom with hot shower running for steam.    Nasal saline rinses and Neti pot  In general for viral respiratory infection -  Aim for drainage/increased airflow  Increase fluids - Pedialyte popsicles, Gatorade mixed with 50% water           Subjective:   he is a 33 y.o.  male who presents for evaluation of   swollen glands,headache and body aches for 2 days. NOlow grade fevers. no nausea and no vomiting . No congestion, sneezing, and productive cough. Over-the-counter remedies including NA   . Hx Asthma:  no  Smoker:  no  Contacts with similar infections: no   Recent travel:no   Sputum Description: absent  COVID vaccinated    Patient is also following up on right foot pain. Currently physical therapy for greater than 12 weeks total and states that he still has residual foot pain on top of foot. Patient has pain with lacing up shoes and midfoot ventral surface. Patient denies any new trauma, physical therapy is recommending further imaging. X-rays were normal and pain is 5 out of 10    Prior to Admission medications    Medication Sig Start Date End Date Taking? Authorizing Provider   naproxen (NAPROSYN) 500 mg tablet Take 1 Tablet by mouth two (2) times daily (with meals). 11/14/22   Ronnell Zeng MD   amphetamine-dextroamphetamine XR (ADDERALL XR) 10 mg XR capsule Take 1 Capsule by mouth Every morning.  9/8/21   Provider, Historical     Patient Active Problem List   Diagnosis Code    Attention deficit hyperactivity disorder (ADHD), predominantly inattentive type F90.0     Patient Active Problem List    Diagnosis Date Noted    Attention deficit hyperactivity disorder (ADHD), predominantly inattentive type 01/10/2022     Current Outpatient Medications   Medication Sig Dispense Refill    naproxen (NAPROSYN) 500 mg tablet Take 1 Tablet by mouth two (2) times daily (with meals). 30 Tablet 2    amphetamine-dextroamphetamine XR (ADDERALL XR) 10 mg XR capsule Take 1 Capsule by mouth Every morning. No Known Allergies  Past Medical History:   Diagnosis Date    ADHD (attention deficit hyperactivity disorder)      No past surgical history on file. No family history on file. Social History     Tobacco Use    Smoking status: Never    Smokeless tobacco: Never   Substance Use Topics    Alcohol use: Yes     Comment: ocassionally        ROS    Objective:   No flowsheet data found.      [INSTRUCTIONS:  \"[x]\" Indicates a positive item  \"[]\" Indicates a negative item  -- DELETE ALL ITEMS NOT EXAMINED]    Constitutional: [x] Appears well-developed and well-nourished [x] No apparent distress      [] Abnormal -     Mental status: [x] Alert and awake  [x] Oriented to person/place/time [x] Able to follow commands    [] Abnormal -     Eyes:   EOM    [x]  Normal    [] Abnormal -   Sclera  [x]  Normal    [] Abnormal -          Discharge [x]  None visible   [] Abnormal -     HENT: [x] Normocephalic, atraumatic  [] Abnormal -   [x] Mouth/Throat: Mucous membranes are moist    External Ears [x] Normal  [] Abnormal -    Neck: [x] No visualized mass [] Abnormal -     Pulmonary/Chest: [x] Respiratory effort normal   [x] No visualized signs of difficulty breathing or respiratory distress        [] Abnormal -      Musculoskeletal:   [x] Normal gait with no signs of ataxia         [x] Normal range of motion of neck        [] Abnormal -     Neurological:        [x] No Facial Asymmetry (Cranial nerve 7 motor function) (limited exam due to video visit)          [x] No gaze palsy        [] Abnormal -          Skin:        [x] No significant exanthematous lesions or discoloration noted on facial skin         [] Abnormal -            Psychiatric:       [x] Normal Affect [] Abnormal -        [x] No Hallucinations    Other pertinent observable physical exam findings:-        We discussed the expected course, resolution and complications of the diagnosis(es) in detail. Medication risks, benefits, costs, interactions, and alternatives were discussed as indicated. I advised him to contact the office if his condition worsens, changes or fails to improve as anticipated. He expressed understanding with the diagnosis(es) and plan. Dawn Jarquin, was evaluated through a synchronous (real-time) audio-video encounter. The patient (or guardian if applicable) is aware that this is a billable service, which includes applicable co-pays. This Virtual Visit was conducted with patient's (and/or legal guardian's) consent. The visit was conducted pursuant to the emergency declaration under the [address] [place] [address], [address] [place] [address] authority and the [employer] Act. Patient identification was verified, and a caregiver was present when appropriate. The patient was located at: Home: [place] [phone]  The provider was located at: Facility (Appt Department):  [facility].  Reilly Ambriz MD

## 2023-03-03 ENCOUNTER — HOSPITAL ENCOUNTER (OUTPATIENT)
Dept: PHYSICAL THERAPY | Age: 33
Discharge: HOME | End: 2023-03-03
Payer: COMMERCIAL

## 2023-03-03 PROCEDURE — 97140 MANUAL THERAPY 1/> REGIONS: CPT | Performed by: PHYSICAL THERAPIST

## 2023-03-03 PROCEDURE — 97016 VASOPNEUMATIC DEVICE THERAPY: CPT | Performed by: PHYSICAL THERAPIST

## 2023-03-03 PROCEDURE — 97110 THERAPEUTIC EXERCISES: CPT | Performed by: PHYSICAL THERAPIST

## 2023-03-03 PROCEDURE — 97112 NEUROMUSCULAR REEDUCATION: CPT | Performed by: PHYSICAL THERAPIST

## 2023-03-03 NOTE — PROGRESS NOTES
Physical Therapy at [facility],   a part of [address] [place], [place] [address] [address], [address] [place] [address], [address]  Phone: [phone] Fax: [phone]    Progress Note    Name: Aminata Wyman   : 1990   MD: Nabila Rudolph MD       Treatment Diagnosis: Other enthesopathies, not elsewhere classified [M77.8]  Ganglion, unspecified site [M67.40]  Pain in right foot [M79.671]  Pain in left foot [M79.672]  Start of Care: 2023    Visits from Start of Care: 9  Missed Visits: 1    Assessment / Recommendations: Pt has attended 9 visits including re-evaluation for anterior ankle and hamstring pain on 2023. Since re-evaluation, pt demonstrates decreased hamstring and anterior ankle pain, improved lower extremity strength, and improvements in gastroc flexibility. He's increased how much he's been able to do in the gym and reports being able to jog on basketball court. Pt continues to demonstrate some limitations in hip abduction strength, limitations in gastroc flexibility, decreased control into pronation, and continued mild anterior ankle pain. Pt would benefit from continued skilled PT to address stated deficits and promote safe return to running and basketball.      Short and Long Term Goals:   Goals for this certification period to be accomplished in 3 weeks:  Pt will reduce hamstring pain to 0/10 to improve QOL and facilitate return to PLOF.--MET  Pt will improve gastroc flexibility by 5 degrees on R to improve foot biomechanics and decrease pain.--PROGRESSING, improved from 2 degrees to 5 degrees     Goals for this certification period to be accomplished in 6 weeks:  Pt will increase hamstring and global hip strength to 5/5 with no pain to improve strength and prepare pt for return to running.--PROGRESSING, R hip abduction is 4+/5  Pt will be able to run at least 2 miles without increase in ankle/hamstring pain.--PROGRESSING  Pt will improve hamstring flexibility by 5 degrees to improve tissue extensibility and reduce strain. --MET  Pt will be able to run, cut, and jump without pain to facilitate safe return to basketball--PROGRESSING    Treatment was performed with direct supervision by Zina Florian, PT,  DPT.     Winsome De La Rosa, SPT 3/3/2023

## 2023-03-03 NOTE — PROGRESS NOTES
PT DAILY TREATMENT NOTE - [facility] -15    Patient Name: Fatimah Chavis  Date:3/3/2023  : 1990  [x]  Patient  Verified  Payor: BLUE LALITHA / Plan: Channing Awad [id number] PPO / Product Type: PPO /    In time: 7:03A Out time: 8:02A  Total Treatment Time (min): 59  Total Timed Codes (min): 49  1:1 Treatment Time ( only): 44  Visit #:  17    Treatment Area: Other enthesopathies, not elsewhere classified [M77.8]  Ganglion, unspecified site [M67.40]  Pain in right foot [M79.671]  Pain in left foot [M79.672]    SUBJECTIVE  Pain Level (0-10 scale): 1-2/10  Any medication changes, allergies to medications, adverse drug reactions, diagnosis change, or new procedure performed?: [x] No    [] Yes (see summary sheet for update)  Subjective functional status/changes:   [] No changes reported  Reports 80% improvement, stating pain has been calmed down while walking. Has been able to run up/down basketball court. Still has some lingering pain. Has returned to the gym; however, was ill last week so hasn't been able to go that often.        OBJECTIVE  Modality rationale: decrease inflammation and decrease pain to improve the patients ability to return to PLOF   Min Type Additional Details       [] Estim: []Att   []Unatt    []TENS instruct                  []IFC  []Premod   []NMES                     []Other:  []w/US   []w/ice   []w/heat  Position:  Location:       []  Traction: [] Cervical       []Lumbar                       [] Prone          []Supine                       []Intermittent   []Continuous Lbs:  [] before manual  [] after manual  []w/heat    []  Ultrasound: []Continuous   [] Pulsed                       at: []1MHz   []3MHz Location:  W/cm2:    [] Paraffin         Location:   []w/heat    []  Ice     []  Heat  []  Ice massage Position:  Location:    []  Laser  []  Other: Position:  Location:   10   [x]  Vasopneumatic Device Pressure:       [] lo [x] med [] hi   Temperature: 34     [x] Skin assessment post-treatment:  [x]intact []redness- no adverse reaction    []redness - adverse reaction:      19 min Therapeutic Exercise:  [x] See flow sheet : Updated HEP--instructed to perform eccentric prone HS curl, 4-way ankle, hamstring isometrics with knees bent, and bridges    Rationale: increase ROM, increase strength, improve coordination, improve balance, and increase proprioception to improve the patients ability to return to running with no pain. 20 min Neuromuscular Re-education: balance and proprioception exercises as outlined on flow sheet. SL RDL with dowel, core press. Rationale: increase ROM, increase strength, improve coordination, improve balance, and increase proprioception to improve the patients ability to return to running with no pain. 10 min Manual Interventions: STM to right HS   Rationale: increase ROM, increase strength, improve coordination, improve balance, and increase proprioception to improve the patients ability to return to running with no pain. With   [] TE   [] TA   [] Neuro   [] SC   [] other: Patient Education: [x] Review HEP    [] Progressed/Changed HEP based on:   [] positioning   [] body mechanics   [] transfers   [] heat/ice application    [] other:      Other Objective/Functional Measures:       Ankle ROM:                            AROM                                                               R                      L                        DF--straight leg           5 degrees        10 degrees                                                             Flexibility: 90/90 Hamstring flexibility--R: 15 degrees, no pain        LOWER QUARTER                            MUSCLE STRENGTH  KEY                                                                             R                      L  0 - No Contraction                   Knee ext                      5/5                  5/5  1 - Trace                                           flex                     5/5 5/5  2 - Poor                                   Hip ext                         5/5 5/5  3 - Fair                                           flex                        5/5 5/5  4 - Good                                        abd                        4+/5                 5/5  5 - Normal                                     add                        5/5 5/5                                                  Ankle DF                     5/5 5/5                                                            PF                     5/5 5/5                                                              Glute max:  5/5 bilaterally                             Pain Level (0-10 scale) post treatment: 2/10    ASSESSMENT/Changes in Function:   Pt has attended 9 visits including re-evaluation for anterior ankle and hamstring pain on 1/26/2023. Since re-evaluation, pt demonstrates decreased hamstring and anterior ankle pain, improved lower extremity strength, and improvements in gastroc flexibility. He's increased how much he's been able to do in the gym and reports being able to jog on basketball court. Pt continues to demonstrate some limitations in hip abduction strength, limitations in gastroc flexibility, decreased control into pronation, and continued mild anterior ankle pain. Pt would benefit from continued skilled PT to address stated deficits and promote safe return to running and basketball.    Patient will continue to benefit from skilled PT services to modify and progress therapeutic interventions, address functional mobility deficits, address ROM deficits, address strength deficits, analyze and address soft tissue restrictions, analyze and cue movement patterns, analyze and modify body mechanics/ergonomics, assess and modify postural abnormalities, and instruct in home and community integration to attain remaining goals.    Treatment was performed with direct supervision by Christopher Ugarte, PT,  DPT. [x]  See Plan of Care  []  See progress note/recertification  []  See Discharge Summary         Progress towards goals / Updated goals:  Short and Long Term Goals:   Goals for this certification period to be accomplished in 3 weeks:  Pt will reduce hamstring pain to 0/10 to improve QOL and facilitate return to PLOF. Pt will improve gastroc flexibility by 5 degrees on R to improve foot biomechanics and decrease pain. Goals for this certification period to be accomplished in 6 weeks:  Pt will increase hamstring and global hip strength to 5/5 with no pain to improve strength and prepare pt for return to running. Pt will be able to run at least 2 miles without increase in ankle/hamstring pain. Pt will improve hamstring flexibility by 5 degrees to improve tissue extensibility and reduce strain. Pt will be able to run, cut, and jump without pain to facilitate safe return to basketball     Frequency / Duration: Patient to be seen 2 times per week for 4-6 weeks.     PLAN  [x]  Upgrade activities as tolerated     [x]  Continue plan of care  []  Update interventions per flow sheet       []  Discharge due to:  []  Other:_      Vesna Brown, SPT 3/3/2023

## 2023-03-06 ENCOUNTER — HOSPITAL ENCOUNTER (OUTPATIENT)
Dept: PHYSICAL THERAPY | Age: 33
Discharge: HOME | End: 2023-03-06
Payer: COMMERCIAL

## 2023-03-06 PROCEDURE — 97112 NEUROMUSCULAR REEDUCATION: CPT | Performed by: PHYSICAL THERAPIST

## 2023-03-06 PROCEDURE — 97110 THERAPEUTIC EXERCISES: CPT | Performed by: PHYSICAL THERAPIST

## 2023-03-06 PROCEDURE — 97140 MANUAL THERAPY 1/> REGIONS: CPT | Performed by: PHYSICAL THERAPIST

## 2023-03-06 NOTE — PROGRESS NOTES
PT DAILY TREATMENT NOTE - [facility] 2-15    Patient Name: Anusha Fuentes  Date:3/6/2023  : 1990  [x]  Patient  Verified  Payor: BLUE CROSS / Plan: Channing Awad [id number] PPO / Product Type: PPO /    In time: 11:49A Out time: 12:38P  Total Treatment Time (min): 49  Total Timed Codes (min): 49  1:1 Treatment Time ([address] only): 44  Visit #:  18    Treatment Area: Other enthesopathies, not elsewhere classified [M77.8]  Ganglion, unspecified site [M67.40]  Pain in right foot [M79.671]  Pain in left foot [M79.672]    SUBJECTIVE  Pain Level (0-10 scale): 1-2/10  Any medication changes, allergies to medications, adverse drug reactions, diagnosis change, or new procedure performed?: [x] No    [] Yes (see summary sheet for update)  Subjective functional status/changes:   [] No changes reported  Reports feeling pretty good, just some lingering hamstring pain. OBJECTIVE    19 min Therapeutic Exercise:  [x] See flow sheet :    Rationale: increase ROM, increase strength, improve coordination, improve balance, and increase proprioception to improve the patients ability to return to running with no pain. 20 min Neuromuscular Re-education: balance and proprioception exercises as outlined on flow sheet. SL RDL with dowel, lifts with band   Rationale: increase ROM, increase strength, improve coordination, improve balance, and increase proprioception to improve the patients ability to return to running with no pain. 10 min Manual Interventions: STM to right HS   Rationale: increase ROM, increase strength, improve coordination, improve balance, and increase proprioception to improve the patients ability to return to running with no pain.        With   [] TE   [] TA   [] Neuro   [] SC   [] other: Patient Education: [x] Review HEP    [] Progressed/Changed HEP based on:   [] positioning   [] body mechanics   [] transfers   [] heat/ice application    [] other:      Other Objective/Functional Measures: FOTO--85/100 Pain Level (0-10 scale) post treatment: 1.5/10    ASSESSMENT/Changes in Function:   Pt demonstrates improved form with side planks with added hip abduction, with improved pt ability to perform; therefore, increased sets today to continue to challenge pt. Pt was able to increase repetitions of unilateral RDL, with decreased LOB compared to previous session. Introduced unilateral lifts with pt demonstrating significant LOB bilaterally. Patient will continue to benefit from skilled PT services to modify and progress therapeutic interventions, address functional mobility deficits, address ROM deficits, address strength deficits, analyze and address soft tissue restrictions, analyze and cue movement patterns, analyze and modify body mechanics/ergonomics, assess and modify postural abnormalities, and instruct in home and community integration to attain remaining goals. Treatment was performed with direct supervision by Gilberto Solis, PT,  DPT. [x]  See Plan of Care  []  See progress note/recertification  []  See Discharge Summary         Progress towards goals / Updated goals:  Short and Long Term Goals:   Goals for this certification period to be accomplished in 3 weeks:  Pt will reduce hamstring pain to 0/10 to improve QOL and facilitate return to PLOF. Pt will improve gastroc flexibility by 5 degrees on R to improve foot biomechanics and decrease pain. Goals for this certification period to be accomplished in 6 weeks:  Pt will increase hamstring and global hip strength to 5/5 with no pain to improve strength and prepare pt for return to running. Pt will be able to run at least 2 miles without increase in ankle/hamstring pain. Pt will improve hamstring flexibility by 5 degrees to improve tissue extensibility and reduce strain.    Pt will be able to run, cut, and jump without pain to facilitate safe return to basketball     Frequency / Duration: Patient to be seen 2 times per week for 4-6 weeks.     PLAN  [x]  Upgrade activities as tolerated     [x]  Continue plan of care  []  Update interventions per flow sheet       []  Discharge due to:  []  Other:_      Sloan Gautam, FRANSISCO 3/6/2023

## 2023-03-14 ENCOUNTER — HOSPITAL ENCOUNTER (OUTPATIENT)
Dept: PHYSICAL THERAPY | Age: 33
Discharge: HOME | End: 2023-03-14
Payer: COMMERCIAL

## 2023-03-14 PROCEDURE — 97140 MANUAL THERAPY 1/> REGIONS: CPT | Performed by: PHYSICAL THERAPIST

## 2023-03-14 PROCEDURE — 97112 NEUROMUSCULAR REEDUCATION: CPT | Performed by: PHYSICAL THERAPIST

## 2023-03-14 NOTE — PROGRESS NOTES
PT DAILY TREATMENT NOTE - [facility] 2-15    Patient Name: Jose Gardner  XDRX:  : 1990  [x]  Patient  Verified  Payor: BLUE LALITHA / Plan: Channing Awad [id number] PPO / Product Type: PPO /    In time: 8:01A Out time: 8:40A  Total Treatment Time (min): 39  Total Timed Codes (min): 29  1:1 Treatment Time ( only): 25  Visit #:  19    Treatment Area: Other enthesopathies, not elsewhere classified [M77.8]  Ganglion, unspecified site [M67.40]  Pain in right foot [M79.671]  Pain in left foot [M79.672]    SUBJECTIVE  Pain Level (0-10 scale): 4/10  Any medication changes, allergies to medications, adverse drug reactions, diagnosis change, or new procedure performed?: [x] No    [] Yes (see summary sheet for update)  Subjective functional status/changes:   [] No changes reported  Reports increased pronation and stress through medial lower leg. Reports increased pain over the past week due to this. Pt was wearing Birkenstocks for a prolonged period. OBJECTIVE    5 min Therapeutic Exercise:  [x] See flow sheet :    Rationale: increase ROM, increase strength, improve coordination, improve balance, and increase proprioception to improve the patients ability to return to running with no pain. 14 min Neuromuscular Re-education: balance and proprioception exercises as outlined on flow sheet. SL RDL with dowel, lifts with band   Rationale: increase ROM, increase strength, improve coordination, improve balance, and increase proprioception to improve the patients ability to return to running with no pain. 10 min Manual Interventions: STM to right HS   Rationale: increase ROM, increase strength, improve coordination, improve balance, and increase proprioception to improve the patients ability to return to running with no pain.        With   [] TE   [] TA   [] Neuro   [] SC   [] other: Patient Education: [x] Review HEP    [] Progressed/Changed HEP based on:   [] positioning   [] body mechanics   [] transfers   [] heat/ice application    [] other:      Other Objective/Functional Measures:                          Pain Level (0-10 scale) post treatment: 2/10    ASSESSMENT/Changes in Function:   Due to pt complaints of overpronation the last week with increased pain, session focused on improving foot intrinsic strength and stability. Pt demonstrated increased difficulty with BAPS even with decreased height; plan to perform in sitting next session to improve form. Pt demonstrates frequent LOB with multidirectional rebounded for ankle stability, requiring frequent stepping strategy to maintain balance. Patient will continue to benefit from skilled PT services to modify and progress therapeutic interventions, address functional mobility deficits, address ROM deficits, address strength deficits, analyze and address soft tissue restrictions, analyze and cue movement patterns, analyze and modify body mechanics/ergonomics, assess and modify postural abnormalities, and instruct in home and community integration to attain remaining goals. Treatment was performed with direct supervision by Rea Lorenz, PT,  DPT. [x]  See Plan of Care  []  See progress note/recertification  []  See Discharge Summary         Progress towards goals / Updated goals:  Short and Long Term Goals:   Goals for this certification period to be accomplished in 3 weeks:  Pt will reduce hamstring pain to 0/10 to improve QOL and facilitate return to PLOF. Pt will improve gastroc flexibility by 5 degrees on R to improve foot biomechanics and decrease pain. Goals for this certification period to be accomplished in 6 weeks:  Pt will increase hamstring and global hip strength to 5/5 with no pain to improve strength and prepare pt for return to running. Pt will be able to run at least 2 miles without increase in ankle/hamstring pain. Pt will improve hamstring flexibility by 5 degrees to improve tissue extensibility and reduce strain.    Pt will be able to run, cut, and jump without pain to facilitate safe return to basketball     Frequency / Duration: Patient to be seen 2 times per week for 4-6 weeks.     PLAN  [x]  Upgrade activities as tolerated     [x]  Continue plan of care  []  Update interventions per flow sheet       []  Discharge due to:  []  Other:_      Render Grounds, SPT 3/14/2023

## 2023-03-16 ENCOUNTER — HOSPITAL ENCOUNTER (OUTPATIENT)
Dept: PHYSICAL THERAPY | Age: 33
Discharge: HOME | End: 2023-03-16
Payer: COMMERCIAL

## 2023-03-16 PROCEDURE — 97112 NEUROMUSCULAR REEDUCATION: CPT | Performed by: PHYSICAL THERAPIST

## 2023-03-16 PROCEDURE — 97110 THERAPEUTIC EXERCISES: CPT | Performed by: PHYSICAL THERAPIST

## 2023-03-16 NOTE — PROGRESS NOTES
PT DAILY TREATMENT NOTE - [facility] 2-15    Patient Name: Jeremi Simons  Date:3/16/2023  : 1990  [x]  Patient  Verified  Payor: Vernadine Allis / Plan: Channing Awad [id number] PPO / Product Type: PPO /    In time: 11:02A Out time: 11:48A  Total Treatment Time (min): 46  Total Timed Codes (min): 46  1:1 Treatment Time ([facility] only): 41  Visit #:  20    Treatment Area: Other enthesopathies, not elsewhere classified [M77.8]  Ganglion, unspecified site [M67.40]  Pain in right foot [M79.671]  Pain in left foot [M79.672]    SUBJECTIVE  Pain Level (0-10 scale): .5/10  Any medication changes, allergies to medications, adverse drug reactions, diagnosis change, or new procedure performed?: [x] No    [] Yes (see summary sheet for update)  Subjective functional status/changes:   [] No changes reported  Reports significant improvement in ankle pain today. Has minimal pain in hamstrings. Reports he had been doing more exercises from PT last session at the gym yesterday and ankle pain has improved. OBJECTIVE    16 min Therapeutic Exercise:  [x] See flow sheet :    Rationale: increase ROM, increase strength, improve coordination, improve balance, and increase proprioception to improve the patients ability to return to running with no pain. 30 min Neuromuscular Re-education: balance and proprioception exercises as outlined on flow sheet. Plyometric jumping on total gym   Rationale: increase ROM, increase strength, improve coordination, improve balance, and increase proprioception to improve the patients ability to return to running with no pain. With   [] TE   [] TA   [] Neuro   [] SC   [] other: Patient Education: [x] Review HEP    [] Progressed/Changed HEP based on:   [] positioning   [] body mechanics   [] transfers   [] heat/ice application    [] other:      Other Objective/Functional Measures:                          Pain Level (0-10 scale) post treatment: 0/10    ASSESSMENT/Changes in Function:    To prepare patient for returning to running, session began by working on plyometric jumping with partial body weight to improve landing mechanics. Pt demonstrates increased R LE ER and landing on lateral aspect of foot initially when performing bilateral jumps; however, improved following cuing to maintain knee in alignment with toes. When performing single leg jumping, pt demonstrated decreased eccentric control and glute activation L>R with increased dynamic knee valgus and over pronation observed. To improve glute activation and eccentric control, had pt perform eccentric single leg step downs with a band, with pt demonstrating decreased balance R>L; however, was able to complete with visual feedback from mirror and tactile cue from band with reported increased glute activation. Pt then performed squats with 3-way reach to improve dynamic single leg balance, with pt demonstrating frequent LOB. To improve single leg balance and glute activation in midstance, added standing hip abduction isometric at the wall. Attempted to perform this with added mini squat; however, pt demonstrates decreased stability and increased trunk compensations, so was discontinued. Pt denied any pain following exercises today. Encouraged pt to continue to strengthen hip stabilizers and to perform gentle hamstring stretching to improve tissue extensibility to decrease pain, with pt in agreement. Plan to work on farnaz next session and began running progression next session depending on response following introduction of new exercises this session.      Patient will continue to benefit from skilled PT services to modify and progress therapeutic interventions, address functional mobility deficits, address ROM deficits, address strength deficits, analyze and address soft tissue restrictions, analyze and cue movement patterns, analyze and modify body mechanics/ergonomics, assess and modify postural abnormalities, and instruct in home and community integration to attain remaining goals. Treatment was performed with direct supervision by Jasvir Ruelas, PT,  DPT. [x]  See Plan of Care  []  See progress note/recertification  []  See Discharge Summary         Progress towards goals / Updated goals:  Short and Long Term Goals:   Goals for this certification period to be accomplished in 3 weeks:  Pt will reduce hamstring pain to 0/10 to improve QOL and facilitate return to PLOF. Pt will improve gastroc flexibility by 5 degrees on R to improve foot biomechanics and decrease pain. Goals for this certification period to be accomplished in 6 weeks:  Pt will increase hamstring and global hip strength to 5/5 with no pain to improve strength and prepare pt for return to running. Pt will be able to run at least 2 miles without increase in ankle/hamstring pain. Pt will improve hamstring flexibility by 5 degrees to improve tissue extensibility and reduce strain. Pt will be able to run, cut, and jump without pain to facilitate safe return to basketball     Frequency / Duration: Patient to be seen 2 times per week for 4-6 weeks.     PLAN  [x]  Upgrade activities as tolerated     [x]  Continue plan of care  []  Update interventions per flow sheet       []  Discharge due to:  []  Other:_      Evon Kelley, FRANSISCO 3/16/2023

## 2023-03-21 ENCOUNTER — APPOINTMENT (OUTPATIENT)
Dept: PHYSICAL THERAPY | Age: 33
End: 2023-03-21
Payer: COMMERCIAL

## 2023-03-22 ENCOUNTER — HOSPITAL ENCOUNTER (OUTPATIENT)
Dept: MRI IMAGING | Age: 33
Discharge: HOME | End: 2023-03-22
Attending: FAMILY MEDICINE
Payer: COMMERCIAL

## 2023-03-22 DIAGNOSIS — M77.8 EXTENSOR TENDONITIS OF FOOT: ICD-10-CM

## 2023-03-22 PROCEDURE — 73718 MRI LOWER EXTREMITY W/O DYE: CPT

## 2023-03-23 ENCOUNTER — HOSPITAL ENCOUNTER (OUTPATIENT)
Dept: PHYSICAL THERAPY | Age: 33
Discharge: HOME | End: 2023-03-23
Payer: COMMERCIAL

## 2023-03-23 PROCEDURE — 97110 THERAPEUTIC EXERCISES: CPT | Performed by: PHYSICAL THERAPIST

## 2023-03-23 PROCEDURE — 97112 NEUROMUSCULAR REEDUCATION: CPT | Performed by: PHYSICAL THERAPIST

## 2023-03-23 NOTE — PROGRESS NOTES
PT DAILY TREATMENT NOTE - [facility] 2-15    Patient Name: Jackelyn Davis  Date:3/23/2023  : 1990  [x]  Patient  Verified  Payor: BLUE CROSS / Plan: Channing VALENTINE Ritesh [id number] PPO / Product Type: PPO /    In time: 11:00A Out time: 12:03P  Total Treatment Time (min): 63  Total Timed Codes (min): 63  1:1 Treatment Time ( [address] only): 41  Visit #:  21    Treatment Area: Other enthesopathies, not elsewhere classified [M77.8]  Ganglion, unspecified site [M67.40]  Pain in right foot [M79.671]  Pain in left foot [M79.672]    SUBJECTIVE  Pain Level (0-10 scale): 0/10  Any medication changes, allergies to medications, adverse drug reactions, diagnosis change, or new procedure performed?: [x] No    [] Yes (see summary sheet for update)  Subjective functional status/changes:   [] No changes reported  Reports he had an MRI and reveled OA  in his foot/ankle. MD recommends continued PT. Overall doing well. Has no pain when asked by PT.       OBJECTIVE    25 min Therapeutic Exercise:  [x] See flow sheet : Manual HS stretching, quad stretching and piriformis. Rationale: increase ROM, increase strength, improve coordination, improve balance, and increase proprioception to improve the patients ability to return to running with no pain. 30 min Neuromuscular Re-education: balance and proprioception exercises as outlined on flow sheet. Plyometric jumping on total gym, RNT eccentric touchdowns. Rationale: increase ROM, increase strength, improve coordination, improve balance, and increase proprioception to improve the patients ability to return to running with no pain.      With   [] TE   [] TA   [] Neuro   [] SC   [] other: Patient Education: [x] Review HEP    [] Progressed/Changed HEP based on:   [] positioning   [] body mechanics   [] transfers   [] heat/ice application    [] other:      Other Objective/Functional Measures:                          Pain Level (0-10 scale) post treatment: 0/10    ASSESSMENT/Changes in Function:   Pt continued to have difficult with push off and landing on TG with proper form. HIs left LE has significant functional genu valgus collapse. Performed jumps today following RNT touchdowns and little carryover. He verbally understands and able to correct when cued. Discussed MRI finding and report. Viewed model to discuss what joint was determined to be arthritic. Discussed orthothic and shoe wear. Plan to work on farnaz next session and began running progression next session depending on response following introduction of new exercises this session. Patient will continue to benefit from skilled PT services to modify and progress therapeutic interventions, address functional mobility deficits, address ROM deficits, address strength deficits, analyze and address soft tissue restrictions, analyze and cue movement patterns, analyze and modify body mechanics/ergonomics, assess and modify postural abnormalities, and instruct in home and community integration to attain remaining goals. Treatment was performed with direct supervision by Malik Barrientos, PT,  DPT. [x]  See Plan of Care  []  See progress note/recertification  []  See Discharge Summary         Progress towards goals / Updated goals:  Short and Long Term Goals:   Goals for this certification period to be accomplished in 3 weeks:  Pt will reduce hamstring pain to 0/10 to improve QOL and facilitate return to PLOF. Pt will improve gastroc flexibility by 5 degrees on R to improve foot biomechanics and decrease pain. Goals for this certification period to be accomplished in 6 weeks:  Pt will increase hamstring and global hip strength to 5/5 with no pain to improve strength and prepare pt for return to running. Pt will be able to run at least 2 miles without increase in ankle/hamstring pain. Pt will improve hamstring flexibility by 5 degrees to improve tissue extensibility and reduce strain.    Pt will be able to run, cut, and jump without pain to facilitate safe return to basketball     Frequency / Duration: Patient to be seen 2 times per week for 4-6 weeks.     PLAN  [x]  Upgrade activities as tolerated     [x]  Continue plan of care  []  Update interventions per flow sheet       []  Discharge due to:  []  Other:_      Sarah Echavarria, PT, DPT 3/23/2023

## 2023-03-29 ENCOUNTER — HOSPITAL ENCOUNTER (OUTPATIENT)
Dept: PHYSICAL THERAPY | Age: 33
Discharge: HOME | End: 2023-03-29
Payer: COMMERCIAL

## 2023-03-29 PROCEDURE — 97112 NEUROMUSCULAR REEDUCATION: CPT

## 2023-03-29 PROCEDURE — 97530 THERAPEUTIC ACTIVITIES: CPT

## 2023-03-29 NOTE — PROGRESS NOTES
PT DAILY TREATMENT NOTE - [facility] 2-15    Patient Name: Memo Ortega  Date:3/29/2023  : 1990  [x]  Patient  Verified  Payor: BLUE LALITHA / Plan: Channing Awad [id number] PPO / Product Type: PPO /    In time: 9:19A Out time: 10:10A  Total Treatment Time (min): 51  Total Timed Codes (min): 51  1:1 Treatment Time ( only): 46  Visit #:  22    Treatment Area: Other enthesopathies, not elsewhere classified [M77.8]  Ganglion, unspecified site [M67.40]  Pain in right foot [M79.671]  Pain in left foot [M79.672]    SUBJECTIVE  Pain Level (0-10 scale): 0/10  Any medication changes, allergies to medications, adverse drug reactions, diagnosis change, or new procedure performed?: [x] No    [] Yes (see summary sheet for update)  Subjective functional status/changes:   [] No changes reported  Pt reported doing well today. OBJECTIVE    5 min Therapeutic Exercise:  [x] See flow sheet :    Rationale: increase ROM, increase strength, improve coordination, improve balance, and increase proprioception to improve the patients ability to return to running with no pain. 31 min Therapeutic Activity:  [x] See flow sheet : return to run screen, running farnaz assessment and education on increase from 156-164 bpm   Rationale: increase ROM, increase strength, improve coordination, improve balance, and increase proprioception to improve the patients ability to return to running with no pain. 15 min Neuromuscular Re-education: side stepping with band around feet, valgus pull with emphasis on glut activation and short foot. 20\"3x each with education on increasing time    Rationale: increase ROM, increase strength, improve coordination, improve balance, and increase proprioception to improve the patients ability to return to running with no pain.      With   [] TE   [] TA   [] Neuro   [] SC   [] other: Patient Education: [x] Review HEP    [] Progressed/Changed HEP based on:   [] positioning   [] body mechanics   [] transfers   [] heat/ice application    [] other:      Other Objective/Functional Measures: --                         Pain Level (0-10 scale) post treatment: 0/10    ASSESSMENT/Changes in Function:   Pt reported no pain with any activities. Advised on return to run progression. Handout provided will assess response next session. Patient will continue to benefit from skilled PT services to modify and progress therapeutic interventions, address functional mobility deficits, address ROM deficits, address strength deficits, analyze and address soft tissue restrictions, analyze and cue movement patterns, analyze and modify body mechanics/ergonomics, assess and modify postural abnormalities, and instruct in home and community integration to attain remaining goals. [x]  See Plan of Care  []  See progress note/recertification  []  See Discharge Summary         Progress towards goals / Updated goals:  Short and Long Term Goals:   Goals for this certification period to be accomplished in 3 weeks:  Pt will reduce hamstring pain to 0/10 to improve QOL and facilitate return to PLOF. Pt will improve gastroc flexibility by 5 degrees on R to improve foot biomechanics and decrease pain. Goals for this certification period to be accomplished in 6 weeks:  Pt will increase hamstring and global hip strength to 5/5 with no pain to improve strength and prepare pt for return to running. Pt will be able to run at least 2 miles without increase in ankle/hamstring pain. Pt will improve hamstring flexibility by 5 degrees to improve tissue extensibility and reduce strain. Pt will be able to run, cut, and jump without pain to facilitate safe return to basketball     Frequency / Duration: Patient to be seen 2 times per week for 4-6 weeks.     PLAN  [x]  Upgrade activities as tolerated     [x]  Continue plan of care  []  Update interventions per flow sheet       []  Discharge due to:  []  Other:_      Jean Claude [place]ch Run, PTA 3/29/2023

## 2023-03-31 ENCOUNTER — HOSPITAL ENCOUNTER (OUTPATIENT)
Dept: PHYSICAL THERAPY | Age: 33
End: 2023-03-31
Payer: COMMERCIAL

## 2023-03-31 PROCEDURE — 97112 NEUROMUSCULAR REEDUCATION: CPT | Performed by: PHYSICAL THERAPIST

## 2023-03-31 PROCEDURE — 97530 THERAPEUTIC ACTIVITIES: CPT | Performed by: PHYSICAL THERAPIST

## 2023-03-31 NOTE — PROGRESS NOTES
PT DAILY TREATMENT NOTE - [facility] 2-15    Patient Name: Anusha Fuentes  Date:3/31/2023  : 1990  [x]  Patient  Verified  Payor: BLUE CROSS / Plan: Channing Awad [id number] PPO / Product Type: PPO /    In time: 11:00a Out time: 11:50a  Total Treatment Time (min): 50  Total Timed Codes (min): 50  1:1 Treatment Time ( [address] only): 45  Visit #:  23    Treatment Area: Other enthesopathies, not elsewhere classified [M77.8]  Ganglion, unspecified site [M67.40]  Pain in right foot [M79.671]  Pain in left foot [M79.672]    SUBJECTIVE  Pain Level (0-10 scale): 0/10  Any medication changes, allergies to medications, adverse drug reactions, diagnosis change, or new procedure performed?: [x] No    [] Yes (see summary sheet for update)  Subjective functional status/changes:   [] No changes reported  Pt reported doing well today. OBJECTIVE    5 min Therapeutic Exercise:  [x] See flow sheet :    Rationale: increase ROM, increase strength, improve coordination, improve balance, and increase proprioception to improve the patients ability to return to running with no pain. 30 min Therapeutic Activity:  [x] See flow sheet :  Active warm up. running farnaz training and education at 168bpm. Initiated return to run 1st level today 4 x 1 min walk, 1 min run. Rationale: increase ROM, increase strength, improve coordination, improve balance, and increase proprioception to improve the patients ability to return to running with no pain. 15 min Neuromuscular Re-education: side stepping with band around feet, valgus pull with emphasis on glut activation and short foot. 30\"2x each with education on increasing time and pulses added   Rationale: increase ROM, increase strength, improve coordination, improve balance, and increase proprioception to improve the patients ability to return to running with no pain.      With   [] TE   [] TA   [] Neuro   [] SC   [] other: Patient Education: [x] Review HEP    [] Progressed/Changed HEP based on:   [] positioning   [] body mechanics   [] transfers   [] heat/ice application    [] other:      Other Objective/Functional Measures: farnaz training 168                         Pain Level (0-10 scale) post treatment: 0/10    ASSESSMENT/Changes in Function:   Pt did well with active warm up and given handout for progression at home. Completed the first level of return to run with no pain. Challenged with farnaz initially but with cues and more reps he improved. Patient will continue to benefit from skilled PT services to modify and progress therapeutic interventions, address functional mobility deficits, address ROM deficits, address strength deficits, analyze and address soft tissue restrictions, analyze and cue movement patterns, analyze and modify body mechanics/ergonomics, assess and modify postural abnormalities, and instruct in home and community integration to attain remaining goals. [x]  See Plan of Care  []  See progress note/recertification  []  See Discharge Summary         Progress towards goals / Updated goals:  Short and Long Term Goals:   Goals for this certification period to be accomplished in 3 weeks:  Pt will reduce hamstring pain to 0/10 to improve QOL and facilitate return to PLOF. Pt will improve gastroc flexibility by 5 degrees on R to improve foot biomechanics and decrease pain. Goals for this certification period to be accomplished in 6 weeks:  Pt will increase hamstring and global hip strength to 5/5 with no pain to improve strength and prepare pt for return to running. Pt will be able to run at least 2 miles without increase in ankle/hamstring pain. Pt will improve hamstring flexibility by 5 degrees to improve tissue extensibility and reduce strain. Pt will be able to run, cut, and jump without pain to facilitate safe return to basketball     Frequency / Duration: Patient to be seen 2 times per week for 4-6 weeks.     PLAN  [x]  Upgrade activities as tolerated     [x]  Continue plan of care  []  Update interventions per flow sheet       []  Discharge due to:  []  Other:_      Ryne Leon, PT, DPT 3/31/2023

## 2023-04-04 ENCOUNTER — HOSPITAL ENCOUNTER (OUTPATIENT)
Dept: PHYSICAL THERAPY | Age: 33
End: 2023-04-04
Payer: COMMERCIAL

## 2023-04-04 PROCEDURE — 97530 THERAPEUTIC ACTIVITIES: CPT | Performed by: PHYSICAL THERAPIST

## 2023-04-04 NOTE — PROGRESS NOTES
PT DAILY TREATMENT NOTE - [facility] 2-15    Patient Name: Becky Arango  Date:2023  : 1990  [x]  Patient  Verified  Payor: BLUE LALITHA / Plan: Channing Awad [id number] PPO / Product Type: PPO /    In time: 7:55a Out time: 8:45A  Total Treatment Time (min): 50  Total Timed Codes (min): 50  1:1 Treatment Time ( only): 45  Visit #:  24    Treatment Area: Other enthesopathies, not elsewhere classified [M77.8]  Ganglion, unspecified site [M67.40]  Pain in right foot [M79.671]  Pain in left foot [M79.672]    SUBJECTIVE  Pain Level (0-10 scale): 0/10  Any medication changes, allergies to medications, adverse drug reactions, diagnosis change, or new procedure performed?: [x] No    [] Yes (see summary sheet for update)  Subjective functional status/changes:   [] No changes reported  Pt reported doing well today, but noting some distal quad soreness on the left leg from jumping. Felt it in basketball. Has started some exercises to help. OBJECTIVE    5 min Therapeutic Exercise:  [x] See flow sheet :    Rationale: increase ROM, increase strength, improve coordination, improve balance, and increase proprioception to improve the patients ability to return to running with no pain. 45 min Therapeutic Activity:  [x] See flow sheet :  Active warm up. running farnaz training and education at 168bpm. Initiated return to run 1st level today 4 x 1 min walk, 1 min run. Rationale: increase ROM, increase strength, improve coordination, improve balance, and increase proprioception to improve the patients ability to return to running with no pain.         With   [] TE   [] TA   [] Neuro   [] SC   [] other: Patient Education: [x] Review HEP    [] Progressed/Changed HEP based on:   [] positioning   [] body mechanics   [] transfers   [] heat/ice application    [] other:      Other Objective/Functional Measures: farnaz training 168                         Pain Level (0-10 scale) post treatment: 0/10    ASSESSMENT/Changes in Function:   Pt did well  increasing speed to 7.0 with farnaz of 168. Much more relaxed through gait pattern. Foam rolled following running progression and instructed him in the progression from here. Will see 1x per week for 2 more sessions with goal of discharge at that time. Patient will continue to benefit from skilled PT services to modify and progress therapeutic interventions, address functional mobility deficits, address ROM deficits, address strength deficits, analyze and address soft tissue restrictions, analyze and cue movement patterns, analyze and modify body mechanics/ergonomics, assess and modify postural abnormalities, and instruct in home and community integration to attain remaining goals. [x]  See Plan of Care  []  See progress note/recertification  []  See Discharge Summary         Progress towards goals / Updated goals:  Short and Long Term Goals:   Goals for this certification period to be accomplished in 3 weeks:  Pt will reduce hamstring pain to 0/10 to improve QOL and facilitate return to PLOF. Pt will improve gastroc flexibility by 5 degrees on R to improve foot biomechanics and decrease pain. Goals for this certification period to be accomplished in 6 weeks:  Pt will increase hamstring and global hip strength to 5/5 with no pain to improve strength and prepare pt for return to running. Pt will be able to run at least 2 miles without increase in ankle/hamstring pain. Pt will improve hamstring flexibility by 5 degrees to improve tissue extensibility and reduce strain. Pt will be able to run, cut, and jump without pain to facilitate safe return to basketball     Frequency / Duration: Patient to be seen 2 times per week for 4-6 weeks.     PLAN  [x]  Upgrade activities as tolerated     [x]  Continue plan of care  []  Update interventions per flow sheet       []  Discharge due to:  []  Other:_      Emilee Kirkpatrick, PT, DPT 4/4/2023

## 2023-04-11 ENCOUNTER — APPOINTMENT (OUTPATIENT)
Dept: PHYSICAL THERAPY | Age: 33
End: 2023-04-11
Payer: COMMERCIAL

## 2023-04-14 ENCOUNTER — HOSPITAL ENCOUNTER (OUTPATIENT)
Dept: PHYSICAL THERAPY | Age: 33
Discharge: HOME | End: 2023-04-14
Payer: COMMERCIAL

## 2023-04-14 PROCEDURE — 97110 THERAPEUTIC EXERCISES: CPT | Performed by: PHYSICAL THERAPIST

## 2023-04-14 PROCEDURE — 97016 VASOPNEUMATIC DEVICE THERAPY: CPT | Performed by: PHYSICAL THERAPIST

## 2023-04-14 PROCEDURE — 97140 MANUAL THERAPY 1/> REGIONS: CPT | Performed by: PHYSICAL THERAPIST

## 2023-04-19 ENCOUNTER — VIRTUAL VISIT (OUTPATIENT)
Dept: INTERNAL MEDICINE CLINIC | Age: 33
End: 2023-04-19
Payer: COMMERCIAL

## 2023-04-19 DIAGNOSIS — M76.891 HAMSTRING TENDONITIS OF RIGHT THIGH: Primary | ICD-10-CM

## 2023-04-19 DIAGNOSIS — B00.1 COLD SORE: ICD-10-CM

## 2023-04-19 PROCEDURE — 99214 OFFICE O/P EST MOD 30 MIN: CPT | Performed by: FAMILY MEDICINE

## 2023-04-19 PROCEDURE — 73552 X-RAY EXAM OF FEMUR 2/>: CPT | Performed by: FAMILY MEDICINE

## 2023-04-19 RX ORDER — VALACYCLOVIR HYDROCHLORIDE 1 G/1
2000 TABLET, FILM COATED ORAL 2 TIMES DAILY
Qty: 6 TABLET | Refills: 4 | Status: SHIPPED | OUTPATIENT
Start: 2023-04-19 | End: 2023-04-20

## 2023-04-19 NOTE — PROGRESS NOTES
Ian Samayoa is a 33 y.o. male who was seen by synchronous (real-time) audio-video technology on 4/19/2023 for No chief complaint on file. Assessment & Plan:   Diagnoses and all orders for this visit:    1. Hamstring tendonitis of right thigh  -     MRI FEMUR RT  WO CONT; Future  -     XR FEMUR RT 2 VS; Future    2. Cold sore    Other orders  -     valACYclovir (VALTREX) 1 gram tablet; Take 2 Tablets by mouth two (2) times a day for 1 day. Explained to patient that this is a normal course of hamstring tendinitis, he is interested in the [facility] sports medicine physical therapy clinic. At this time I think we continue with physical therapy and obtain an MRI, patient is agreeable to this      Subjective:     Patient is a 28-year-old male with a history of cold sores. Patient states that about 3 weeks ago he started with his first cold sore recurrence which was treated with Valtrex, this went away and then reoccurred about 2 weeks ago and now is feeling a tingling sensation again. Patient would like a longer refill on his Valtrex for his cold sore. Denies any current pain or outbreak. Patient also states that he has been in physical therapy for greater than 6 weeks with several setbacks when he tries to run again for his hamstring. Patient states that his pain is about 6 out of 10 when it occurs with running and he is getting slightly frustrated that he is not getting much better. Prior to Admission medications    Medication Sig Start Date End Date Taking? Authorizing Provider   valACYclovir (VALTREX) 1 gram tablet Take 2 Tablets by mouth two (2) times a day for 1 day. 4/19/23 4/20/23 Yes Albertina Narayanan MD   methylPREDNISolone (MEDROL DOSEPACK) 4 mg tablet Take as directed 2/27/23   Albertina Narayanan MD   naproxen (NAPROSYN) 500 mg tablet Take 1 Tablet by mouth two (2) times daily (with meals).  11/14/22   Albertina Narayanan MD   amphetamine-dextroamphetamine XR (ADDERALL XR) 10 mg XR capsule Take 1 Capsule by mouth Every morning. 9/8/21   Provider, Historical     Patient Active Problem List   Diagnosis Code    Attention deficit hyperactivity disorder (ADHD), predominantly inattentive type F90.0     Patient Active Problem List    Diagnosis Date Noted    Attention deficit hyperactivity disorder (ADHD), predominantly inattentive type 01/10/2022     Current Outpatient Medications   Medication Sig Dispense Refill    valACYclovir (VALTREX) 1 gram tablet Take 2 Tablets by mouth two (2) times a day for 1 day. 6 Tablet 4    methylPREDNISolone (MEDROL DOSEPACK) 4 mg tablet Take as directed 1 Dose Pack 0    naproxen (NAPROSYN) 500 mg tablet Take 1 Tablet by mouth two (2) times daily (with meals). 30 Tablet 2    amphetamine-dextroamphetamine XR (ADDERALL XR) 10 mg XR capsule Take 1 Capsule by mouth Every morning. No Known Allergies  Past Medical History:   Diagnosis Date    ADHD (attention deficit hyperactivity disorder)      No past surgical history on file. No family history on file. Social History     Tobacco Use    Smoking status: Never    Smokeless tobacco: Never   Substance Use Topics    Alcohol use: Yes     Comment: ocassionally        ROS    Objective:   No flowsheet data found.      [INSTRUCTIONS:  \"[x]\" Indicates a positive item  \"[]\" Indicates a negative item  -- DELETE ALL ITEMS NOT EXAMINED]    Constitutional: [x] Appears well-developed and well-nourished [x] No apparent distress      [] Abnormal -     Mental status: [x] Alert and awake  [x] Oriented to person/place/time [x] Able to follow commands    [] Abnormal -     Eyes:   EOM    [x]  Normal    [] Abnormal -   Sclera  [x]  Normal    [] Abnormal -          Discharge [x]  None visible   [] Abnormal -     HENT: [x] Normocephalic, atraumatic  [] Abnormal -   [x] Mouth/Throat: Mucous membranes are moist    External Ears [x] Normal  [] Abnormal -    Neck: [x] No visualized mass [] Abnormal -     Pulmonary/Chest: [x] Respiratory effort normal   [x] No visualized signs of difficulty breathing or respiratory distress        [] Abnormal -      Musculoskeletal:   [x] Normal gait with no signs of ataxia         [x] Normal range of motion of neck        [] Abnormal -     Neurological:        [x] No Facial Asymmetry (Cranial nerve 7 motor function) (limited exam due to video visit)          [x] No gaze palsy        [] Abnormal -          Skin:        [x] No significant exanthematous lesions or discoloration noted on facial skin         [] Abnormal -            Psychiatric:       [x] Normal Affect [] Abnormal -        [x] No Hallucinations    Other pertinent observable physical exam findings:-        We discussed the expected course, resolution and complications of the diagnosis(es) in detail. Medication risks, benefits, costs, interactions, and alternatives were discussed as indicated. I advised him to contact the office if his condition worsens, changes or fails to improve as anticipated. He expressed understanding with the diagnosis(es) and plan. Ofelia Epstein, was evaluated through a synchronous (real-time) audio-video encounter. The patient (or guardian if applicable) is aware that this is a billable service, which includes applicable co-pays. This Virtual Visit was conducted with patient's (and/or legal guardian's) consent. The visit was conducted pursuant to the emergency declaration under the [address] [place] [address] authority and the [employer] General Act. Patient identification was verified, and a caregiver was present when appropriate. The patient was located at: Home: [place] [phone]  The provider was located at: Facility (Appt Department): [facility].  Nenita Mcpherson MD

## 2023-04-20 ENCOUNTER — HOSPITAL ENCOUNTER (OUTPATIENT)
Dept: PHYSICAL THERAPY | Age: 33
Discharge: HOME | End: 2023-04-20
Payer: COMMERCIAL

## 2023-04-20 PROCEDURE — 97140 MANUAL THERAPY 1/> REGIONS: CPT | Performed by: PHYSICAL THERAPIST

## 2023-04-20 PROCEDURE — 97110 THERAPEUTIC EXERCISES: CPT | Performed by: PHYSICAL THERAPIST

## 2023-04-25 ENCOUNTER — HOSPITAL ENCOUNTER (OUTPATIENT)
Dept: MRI IMAGING | Age: 33
Discharge: HOME | End: 2023-04-25
Attending: FAMILY MEDICINE
Payer: COMMERCIAL

## 2023-04-25 DIAGNOSIS — M76.891 HAMSTRING TENDONITIS OF RIGHT THIGH: ICD-10-CM

## 2023-04-25 PROCEDURE — 73718 MRI LOWER EXTREMITY W/O DYE: CPT

## 2023-05-10 ENCOUNTER — HOSPITAL ENCOUNTER (OUTPATIENT)
Dept: PHYSICAL THERAPY | Facility: HOSPITAL | Age: 33
Setting detail: RECURRING SERIES
Discharge: HOME | End: 2023-05-13

## 2023-05-10 NOTE — PROGRESS NOTES
PHYSICAL THERAPY - MEDICARE DAILY TREATMENT NOTE (updated 3/23)      Date: 5/10/2023          Patient Name:  Vanna Peoples :  1990   Medical   Diagnosis:  Other enthesopathies, not elsewhere classified [M77.8]  Ganglion, unspecified site [M67.40]  Pain in right foot [M79.671] Treatment Diagnosis:  M79.651  Pain in right thigh and M79.671  RIGHT FOOT PAIN    Referral Source:  Ginette Bennett MD Insurance:   Payor: Cheyenne Monday / Plan: Kellie Dash / Product Type: *No Product type* /                     Patient  verified yes     Visit #   Current  / Total 27    Time   In / Out 8:37A 9:29A   Total Treatment Time 52   Total Timed Codes 47   1:1 Treatment Time 52      [facility] Totals Reminder:  bill using total billable   min of TIMED therapeutic procedures and modalities. 8-22 min = 1 unit; 23-37 min = 2 units; 38-52 min = 3 units; 53-67 min = 4 units; 68-82 min = 5 units            SUBJECTIVE    Pain Level (0-10 scale): 3/10    Any medication changes, allergies to medications, adverse drug reactions, diagnosis change, or new procedure performed?: [x] No    [] Yes (see summary sheet for update)  Medications: Verified on Patient Summary List    Subjective functional status/changes: The pt reports he got his MRI and reports shows it is normal. He was talking with a friend and thinking maybe it was coming from his back. Pt denies any radicular pain or paresthesias. Pain during workout and basketball is not present and occurs at rest sitting or following exercise. He had not been consistently doing his exercises and then tried to perform everything  and return to run cycle last week. Noted soreness and increased pain that evening. OBJECTIVE      Therapeutic Procedures:   Tx Min Billable or 1:1 Min (if diff from Tx Min) Procedure, Rationale, Specifics   5 0 [id number] Therapeutic Exercise (timed):  increase ROM, strength, coordination, balance, and proprioception to improve patient's ability to progress to PLOF and

## 2023-05-19 ENCOUNTER — APPOINTMENT (OUTPATIENT)
Dept: PHYSICAL THERAPY | Facility: HOSPITAL | Age: 33
End: 2023-05-19
Payer: COMMERCIAL

## 2023-05-25 ENCOUNTER — HOSPITAL ENCOUNTER (OUTPATIENT)
Dept: PHYSICAL THERAPY | Facility: HOSPITAL | Age: 33
Setting detail: RECURRING SERIES
Discharge: HOME | End: 2023-05-28
Payer: COMMERCIAL

## 2023-05-25 PROCEDURE — 97110 THERAPEUTIC EXERCISES: CPT

## 2023-05-25 PROCEDURE — 97140 MANUAL THERAPY 1/> REGIONS: CPT

## 2023-06-05 ENCOUNTER — OFFICE VISIT (OUTPATIENT)
Facility: CLINIC | Age: 33
End: 2023-06-05
Payer: COMMERCIAL

## 2023-06-05 VITALS
BODY MASS INDEX: 24.05 KG/M2 | WEIGHT: 168 LBS | DIASTOLIC BLOOD PRESSURE: 81 MMHG | TEMPERATURE: 98.6 F | HEIGHT: 70 IN | SYSTOLIC BLOOD PRESSURE: 124 MMHG | RESPIRATION RATE: 16 BRPM | OXYGEN SATURATION: 98 % | HEART RATE: 80 BPM

## 2023-06-05 DIAGNOSIS — M76.891 HAMSTRING TENDONITIS OF RIGHT THIGH: ICD-10-CM

## 2023-06-05 DIAGNOSIS — Z20.2 POSSIBLE EXPOSURE TO STD: Primary | ICD-10-CM

## 2023-06-05 DIAGNOSIS — M54.2 NECK PAIN: ICD-10-CM

## 2023-06-05 DIAGNOSIS — M62.838 TRAPEZIUS MUSCLE SPASM: ICD-10-CM

## 2023-06-05 PROCEDURE — 99214 OFFICE O/P EST MOD 30 MIN: CPT | Performed by: FAMILY MEDICINE

## 2023-06-05 ASSESSMENT — PATIENT HEALTH QUESTIONNAIRE - PHQ9
SUM OF ALL RESPONSES TO PHQ QUESTIONS 1-9: 0
SUM OF ALL RESPONSES TO PHQ QUESTIONS 1-9: 0
2. FEELING DOWN, DEPRESSED OR HOPELESS: 0
SUM OF ALL RESPONSES TO PHQ QUESTIONS 1-9: 0
1. LITTLE INTEREST OR PLEASURE IN DOING THINGS: 0
SUM OF ALL RESPONSES TO PHQ9 QUESTIONS 1 & 2: 0
SUM OF ALL RESPONSES TO PHQ QUESTIONS 1-9: 0

## 2023-06-05 NOTE — PROGRESS NOTES
Chief Complaint   Patient presents with    Neck Pain     Patient is here for neck pain     Exposure to STD     Patient states he would like to be tested for STD      he is a 33y.o. year old male who presents for evaluation of neck pain   Pain Assessment Encounter      Xochitl Sotomayor  6/5/2023  Onset of Symptoms: Patient states he has had pain for months   ________________________________________________________________________  Description:Patient states no injures     Frequency: 5 times a day  Pain Scale:(1-10): 4  Trauma Hx: none  Hx of similar symptoms: No:   Radiation: No:  Duration:  continuous      Progression: has worsened  What makes it better?: exercise, heat, and OTC meds  What makes it worse?:strecthing  Medications tried: acetaminophen    Reviewed and agree with Nurse Note and duplicated in this note. Reviewed PmHx, RxHx, FmHx, SocHx, AllgHx and updated and dated in the chart. No family history on file.     Past Medical History:   Diagnosis Date    ADHD (attention deficit hyperactivity disorder)       Social History     Socioeconomic History    Marital status: Single   Tobacco Use    Smoking status: Never    Smokeless tobacco: Never   Substance and Sexual Activity    Alcohol use: Yes    Drug use: No        Review of Systems - negative except as listed above      Objective:     Vitals:    06/05/23 1602   Resp: 16   Weight: 168 lb (76.2 kg)   Height: 5' 10\" (1.778 m)       Physical Examination: General Appearance: alert and oriented to person, place and time, well-developed and well-nourished, in no acute distress  Skin: warm and dry, no rash or erythema  Head: normocephalic and atraumatic  Eyes: pupils equal, round, and reactive to light, extraocular eye movements intact, conjunctivae normal  ENT: tympanic membrane, external ear and ear canal normal bilaterally, oropharynx clear and moist with normal mucous membranes  Neck: neck supple and non tender without mass, no thyromegaly or thyroid Pt calling she is still very nauseous, would like a refill of zofran, but would also like the BP medication that she has gotten in the past that is used for withdrawal and chills to be sent to pharmacy, pt uses pended pharmacy, please advise at above number.

## 2023-06-06 LAB
HIV 1+2 AB+HIV1 P24 AG SERPL QL IA: NONREACTIVE
HIV 1/2 RESULT COMMENT: NORMAL
RPR SER QL: NONREACTIVE

## 2023-06-10 LAB
C TRACH RRNA SPEC QL NAA+PROBE: NEGATIVE
N GONORRHOEA RRNA SPEC QL NAA+PROBE: NEGATIVE
SPECIMEN SOURCE: NORMAL
T VAGINALIS RRNA SPEC QL NAA+PROBE: NEGATIVE

## 2023-06-15 ENCOUNTER — HOSPITAL ENCOUNTER (OUTPATIENT)
Dept: PHYSICAL THERAPY | Facility: HOSPITAL | Age: 33
Setting detail: RECURRING SERIES
Discharge: HOME | End: 2023-06-18
Attending: FAMILY MEDICINE
Payer: COMMERCIAL

## 2023-06-15 PROCEDURE — 20560 NDL INSJ W/O NJX 1 OR 2 MUSC: CPT

## 2023-06-15 PROCEDURE — 97032 APPL MODALITY 1+ESTIM EA 15: CPT

## 2023-06-15 PROCEDURE — 97110 THERAPEUTIC EXERCISES: CPT

## 2023-07-10 ENCOUNTER — HOSPITAL ENCOUNTER (OUTPATIENT)
Dept: PHYSICAL THERAPY | Facility: HOSPITAL | Age: 33
Setting detail: RECURRING SERIES
Discharge: HOME | End: 2023-07-13
Attending: FAMILY MEDICINE
Payer: COMMERCIAL

## 2023-07-10 PROCEDURE — 97110 THERAPEUTIC EXERCISES: CPT | Performed by: PHYSICAL MEDICINE & REHABILITATION

## 2023-07-10 NOTE — PROGRESS NOTES
PHYSICAL THERAPY - MEDICARE DAILY TREATMENT NOTE (updated 3/23)      Date: 7/10/2023          Patient Name:  Yazmin Ladd :  1990   Medical   Diagnosis:  Neck pain [M54.2]  Trapezius muscle spasm [M62.838]  Hamstring tendonitis of right thigh [M76.891] Treatment Diagnosis:  M79.651  Pain in right thigh and M79.671  RIGHT FOOT PAIN    Referral Source:  Anusha Rivera MD Insurance:   Payor: Rosendo Valdes / Plan: Ronit Sanchez / Product Type: *No Product type* /                     Patient  verified yes     Visit #   Current  / Total 30    Time   In / Out 8:02A 8:40A   Total Treatment Time 38   Total Timed Codes 38   1:1 Treatment Time 31      [facility] Totals Reminder:  bill using total billable   min of TIMED therapeutic procedures and modalities. 8-22 min = 1 unit; 23-37 min = 2 units; 38-52 min = 3 units; 53-67 min = 4 units; 68-82 min = 5 units            SUBJECTIVE    Pain Level (0-10 scale): 0/10    Any medication changes, allergies to medications, adverse drug reactions, diagnosis change, or new procedure performed?: [x] No    [] Yes (see summary sheet for update)  Medications: Verified on Patient Summary List    Subjective functional status/changes: The pt reports he was able to run and things were pretty fine, and has not had much of any R HS pain. He got sick for 10 days a couple weeks ago and pain seemed to return; most pain in the left achilles area only at this point, particularly with return to run progression. OBJECTIVE      Therapeutic Procedures: Tx Min Billable or 1:1 Min (if diff from Tx Min) Procedure, Rationale, Specifics   38 [phone] [id number] Therapeutic Exercise (timed):  increase ROM, strength, coordination, balance, and proprioception to improve patient's ability to progress to PLOF and address remaining functional goals.  (see flow sheet as applicable)     Details if applicable:  Assessment and re-assessment   38 31    Total Total     Other Objective/Functional Measures      Palpation:

## 2023-07-12 ENCOUNTER — OFFICE VISIT (OUTPATIENT)
Age: 33
End: 2023-07-12

## 2023-07-12 VITALS
BODY MASS INDEX: 23.62 KG/M2 | RESPIRATION RATE: 18 BRPM | HEART RATE: 79 BPM | HEIGHT: 70 IN | OXYGEN SATURATION: 99 % | DIASTOLIC BLOOD PRESSURE: 89 MMHG | WEIGHT: 165 LBS | TEMPERATURE: 97.9 F | SYSTOLIC BLOOD PRESSURE: 135 MMHG

## 2023-07-12 DIAGNOSIS — J01.90 ACUTE BACTERIAL SINUSITIS: Primary | ICD-10-CM

## 2023-07-12 DIAGNOSIS — B96.89 ACUTE BACTERIAL SINUSITIS: Primary | ICD-10-CM

## 2023-07-12 RX ORDER — AMOXICILLIN AND CLAVULANATE POTASSIUM 875; 125 MG/1; MG/1
1 TABLET, FILM COATED ORAL 2 TIMES DAILY
Qty: 14 TABLET | Refills: 0 | Status: SHIPPED | OUTPATIENT
Start: 2023-07-12 | End: 2023-07-19

## 2023-07-14 ENCOUNTER — TELEMEDICINE (OUTPATIENT)
Facility: CLINIC | Age: 33
End: 2023-07-14

## 2023-07-14 ENCOUNTER — HOSPITAL ENCOUNTER (OUTPATIENT)
Dept: PHYSICAL THERAPY | Facility: HOSPITAL | Age: 33
Setting detail: RECURRING SERIES
Discharge: HOME | End: 2023-07-17
Attending: FAMILY MEDICINE
Payer: COMMERCIAL

## 2023-07-14 DIAGNOSIS — M76.62 LEFT ACHILLES TENDINITIS: ICD-10-CM

## 2023-07-14 DIAGNOSIS — J01.90 ACUTE BACTERIAL SINUSITIS: Primary | ICD-10-CM

## 2023-07-14 DIAGNOSIS — B96.89 ACUTE BACTERIAL SINUSITIS: Primary | ICD-10-CM

## 2023-07-14 PROCEDURE — 97110 THERAPEUTIC EXERCISES: CPT | Performed by: PHYSICAL MEDICINE & REHABILITATION

## 2023-07-14 PROCEDURE — 97140 MANUAL THERAPY 1/> REGIONS: CPT | Performed by: PHYSICAL MEDICINE & REHABILITATION

## 2023-07-14 PROCEDURE — 97112 NEUROMUSCULAR REEDUCATION: CPT | Performed by: PHYSICAL MEDICINE & REHABILITATION

## 2023-07-14 SDOH — ECONOMIC STABILITY: FOOD INSECURITY: WITHIN THE PAST 12 MONTHS, YOU WORRIED THAT YOUR FOOD WOULD RUN OUT BEFORE YOU GOT MONEY TO BUY MORE.: NEVER TRUE

## 2023-07-14 SDOH — ECONOMIC STABILITY: FOOD INSECURITY: WITHIN THE PAST 12 MONTHS, THE FOOD YOU BOUGHT JUST DIDN'T LAST AND YOU DIDN'T HAVE MONEY TO GET MORE.: NEVER TRUE

## 2023-07-14 SDOH — ECONOMIC STABILITY: INCOME INSECURITY: HOW HARD IS IT FOR YOU TO PAY FOR THE VERY BASICS LIKE FOOD, HOUSING, MEDICAL CARE, AND HEATING?: NOT HARD AT ALL

## 2023-07-14 SDOH — ECONOMIC STABILITY: HOUSING INSECURITY
IN THE LAST 12 MONTHS, WAS THERE A TIME WHEN YOU DID NOT HAVE A STEADY PLACE TO SLEEP OR SLEPT IN A SHELTER (INCLUDING NOW)?: NO

## 2023-07-14 SDOH — ECONOMIC STABILITY: TRANSPORTATION INSECURITY
IN THE PAST 12 MONTHS, HAS LACK OF TRANSPORTATION KEPT YOU FROM MEETINGS, WORK, OR FROM GETTING THINGS NEEDED FOR DAILY LIVING?: NO

## 2023-07-14 NOTE — PROGRESS NOTES
2023    TELEHEALTH EVALUATION -- Audio/Visual    HPI:    Mirna Suarez (:  1990) has requested an audio/video evaluation for the following concern(s):    Sick for about 4 weeks, was seen at urgent care for sinusitis  2 days ago. Antibiotic was given mucinex and antihistamine. Denies any current fever chills sweats but states that he did have some chills a few days ago. He has not started the Augmentin that was prescribed for him yet and would like to know if there is anything else he should worry about. No known sick contacts, has not gotten any COVID testing done    Patient also states that he has had on and off left Achilles pain over several years not stemming from any injuries. Pain does not radiate stays to the posterior heel. He is currently physical therapy for neck pain which has resolved and he would like to switch to his left Achilles. Currently pain is 2 out of 10 at most.    Review of Systems    Prior to Visit Medications    Medication Sig Taking? Authorizing Provider   amoxicillin-clavulanate (AUGMENTIN) 875-125 MG per tablet Take 1 tablet by mouth 2 times daily for 7 days  RAZ Umanzor NP   amphetamine-dextroamphetamine (ADDERALL XR) 10 MG extended release capsule Take 1 capsule by mouth every morning.   Ar Automatic Reconciliation       Social History     Tobacco Use    Smoking status: Never    Smokeless tobacco: Never   Substance Use Topics    Alcohol use: Yes    Drug use: No        No Known Allergies,   Past Medical History:   Diagnosis Date    ADHD (attention deficit hyperactivity disorder)    , No past surgical history on file.,   Social History     Tobacco Use    Smoking status: Never    Smokeless tobacco: Never   Substance Use Topics    Alcohol use: Yes    Drug use: No   , No family history on file.,   Immunization History   Administered Date(s) Administered    COVID-19, PFIZER PURPLE top, DILUTE for use, (age 15 y+), 30mcg/0.3mL 2021, 07/15/2021   ,   Health

## 2023-07-14 NOTE — PATIENT INSTRUCTIONS
Adults: For nasal congestion, cough and cold/flu symptoms I advised:    - Seek medical care if symptoms become more severe or if you develop      chest pain, shortness of breath, confusion.    - Contact us if your symptoms fail to improve after 7-10 days   - Rest as much as possible and stay home from work/school at least 24 hours                  after last fever              - Wash hand frequently and cough/sneeze into your sleeve to help prevent       infection of others   - Drink plenty of fluids   - Ibuprofen (Advil, Motrin) 400-800mg every 6 hours or                  Aleve 220 mg 1-2 pills every 8 hours for fever, headache, pain   - Tylenol extra strength 500 mg every 6 hours for pain, headache, fever   - Nasal saline rinses 2-3 times daily for nasal congestion   - Mucinex 1200 mg twice daily or Guaifenesin 400 mg every 4 hours for chest       congestion              - Robitussin DM or Delsym for cough(suppress cough and thin mucus. )   - Cepacol throat lozenges and saline gargles (1 tsp salt in 8 oz water) for sore       throat   - Tea with honey for cough (buckwheat honey preferred)              - Benadryl (diphenhydramine) 50 mg at night for nasal congestion/allergies   - Pseudoephedrine 12-hour tablets twice daily for nasal and inner ear       -Ask your pharmacist (this is kept behind the counter)     -If you have high blood pressure or heart disease, use this        medication with caution (ask your doctor), alternative coricidin   - Afrin (oxymetazoline) nasal spray 2 sprays in each nostril twice daily for severe      congestion.       -Do not use this medication for more than 3 days as it may cause         \"rebound congestion\".        -If you have high blood pressure or heart disease, use this medication       with caution (ask your doctor)      Children:   Sit in bathroom with hot shower running for steam.    Nasal saline rinses and Neti pot  In general for viral respiratory infection -  Aim for

## 2023-07-17 ENCOUNTER — HOSPITAL ENCOUNTER (OUTPATIENT)
Dept: PHYSICAL THERAPY | Facility: HOSPITAL | Age: 33
Setting detail: RECURRING SERIES
Discharge: HOME | End: 2023-07-20
Attending: FAMILY MEDICINE
Payer: COMMERCIAL

## 2023-07-17 PROCEDURE — 97110 THERAPEUTIC EXERCISES: CPT | Performed by: PHYSICAL MEDICINE & REHABILITATION

## 2023-07-17 PROCEDURE — 97112 NEUROMUSCULAR REEDUCATION: CPT | Performed by: PHYSICAL MEDICINE & REHABILITATION

## 2023-07-17 NOTE — PROGRESS NOTES
PHYSICAL THERAPY - MEDICARE DAILY TREATMENT NOTE (updated 3/23)      Date: 2023          Patient Name:  Tanda Alpers :  1990   Medical   Diagnosis:  Neck pain [M54.2]  Trapezius muscle spasm [M62.838]  Hamstring tendonitis of right thigh [M76.891] Treatment Diagnosis:  M79.651  Pain in right thigh and M79.671  RIGHT FOOT PAIN    Referral Source:  Travis Olszewski, MD Insurance:   Payor: Flavio Hi / Plan: Antonio Tariq / Product Type: *No Product type* /                     Patient  verified yes     Visit #   Current  / Total 32    Time   In / Out 9:31A 10:19A   Total Treatment Time 48   Total Timed Codes 48   1:1 Treatment Time 41      [facility] Totals Reminder:  bill using total billable   min of TIMED therapeutic procedures and modalities. 8-22 min = 1 unit; 23-37 min = 2 units; 38-52 min = 3 units; 53-67 min = 4 units; 68-82 min = 5 units          SUBJECTIVE    Pain Level (0-10 scale): 4    Any medication changes, allergies to medications, adverse drug reactions, diagnosis change, or new procedure performed?: [x] No    [] Yes (see summary sheet for update)  Medications: Verified on Patient Summary List    Subjective functional status/changes: The pt reports some usual posterolateral heel pain. He left in some soreness after last session. HS has been good. OBJECTIVE      Therapeutic Procedures: Tx Min Billable or 1:1 Min (if diff from Tx Min) Procedure, Rationale, Specifics   39 [phone] [id number] Therapeutic Exercise (timed):  increase ROM, strength, coordination, balance, and proprioception to improve patient's ability to progress to PLOF and address remaining functional goals.  (see flow sheet as applicable)     Details if applicable:     9 [phone] [id number] Neuromuscular Re-Education (timed):  improve balance, coordination, kinesthetic sense, posture, core stability and proprioception to improve patient's ability to develop conscious control of individual muscles and awareness of position of extremities in

## 2023-07-19 ENCOUNTER — HOSPITAL ENCOUNTER (OUTPATIENT)
Dept: PHYSICAL THERAPY | Facility: HOSPITAL | Age: 33
Setting detail: RECURRING SERIES
Discharge: HOME | End: 2023-07-22
Attending: FAMILY MEDICINE
Payer: COMMERCIAL

## 2023-07-19 PROCEDURE — 97110 THERAPEUTIC EXERCISES: CPT | Performed by: PHYSICAL MEDICINE & REHABILITATION

## 2023-07-19 PROCEDURE — 97140 MANUAL THERAPY 1/> REGIONS: CPT | Performed by: PHYSICAL MEDICINE & REHABILITATION

## 2023-07-19 NOTE — PROGRESS NOTES
PHYSICAL THERAPY - MEDICARE DAILY TREATMENT NOTE (updated 3/23)      Date: 2023          Patient Name:  Noe Rabago :  1990   Medical   Diagnosis:  Neck pain [M54.2]  Trapezius muscle spasm [M62.838]  Hamstring tendonitis of right thigh [M76.891] Treatment Diagnosis:  M79.651  Pain in right thigh and M79.671  RIGHT FOOT PAIN    Referral Source:  Aisha Mendieta MD Insurance:   Payor: Rigoberto Lawler / Plan: Nancy Reyez / Product Type: *No Product type* /                     Patient  verified yes     Visit #   Current  / Total 33    Time   In / Out 10:05A 10:55A   Total Treatment Time 50   Total Timed Codes 50   1:1 Treatment Time 41      [facility] Totals Reminder:  bill using total billable   min of TIMED therapeutic procedures and modalities. 8-22 min = 1 unit; 23-37 min = 2 units; 38-52 min = 3 units; 53-67 min = 4 units; 68-82 min = 5 units          SUBJECTIVE    Pain Level (0-10 scale): 4    Any medication changes, allergies to medications, adverse drug reactions, diagnosis change, or new procedure performed?: [x] No    [] Yes (see summary sheet for update)  Medications: Verified on Patient Summary List    Subjective functional status/changes: The pt reports same soreness. He did work on [employer] since last session. OBJECTIVE      Therapeutic Procedures: Tx Min Billable or 1:1 Min (if diff from Tx Min) Procedure, Rationale, Specifics   25 16 [id number] Therapeutic Exercise (timed):  increase ROM, strength, coordination, balance, and proprioception to improve patient's ability to progress to PLOF and address remaining functional goals. (see flow sheet as applicable)     Details if applicable:     25 19 15379 Manual Therapy (timed):  decrease pain, increase ROM, increase tissue extensibility, and decrease trigger points to improve patient's ability to progress to PLOF and address remaining functional goals.   The manual therapy interventions were performed at a separate and distinct time from the

## 2023-07-28 ENCOUNTER — HOSPITAL ENCOUNTER (OUTPATIENT)
Dept: PHYSICAL THERAPY | Facility: HOSPITAL | Age: 33
Setting detail: RECURRING SERIES
Discharge: HOME | End: 2023-07-31
Attending: FAMILY MEDICINE
Payer: COMMERCIAL

## 2023-07-28 PROCEDURE — 97140 MANUAL THERAPY 1/> REGIONS: CPT | Performed by: PHYSICAL MEDICINE & REHABILITATION

## 2023-07-28 PROCEDURE — 97110 THERAPEUTIC EXERCISES: CPT | Performed by: PHYSICAL MEDICINE & REHABILITATION

## 2023-07-28 NOTE — PROGRESS NOTES
PHYSICAL THERAPY - MEDICARE DAILY TREATMENT NOTE (updated 3/23)      Date: 2023          Patient Name:  Elina Shin :  1990   Medical   Diagnosis:  Neck pain [M54.2]  Trapezius muscle spasm [M62.838]  Hamstring tendonitis of right thigh [M76.891] Treatment Diagnosis:  M79.651  Pain in right thigh and M79.671  RIGHT FOOT PAIN    Referral Source:  Mir Diggs MD Insurance:   Payor: Sim [employer] / Plan: [employer] / Product Type: *No Product type* /                     Patient  verified yes     Visit #   Current  / Total 34    Time   In / Out 11:50A 12:38P   Total Treatment Time 48   Total Timed Codes 48   1:1 Treatment Time 37      [facility] Totals Reminder:  bill using total billable   min of TIMED therapeutic procedures and modalities. 8-22 min = 1 unit; 23-37 min = 2 units; 38-52 min = 3 units; 53-67 min = 4 units; 68-82 min = 5 units          SUBJECTIVE    Pain Level (0-10 scale): 2    Any medication changes, allergies to medications, adverse drug reactions, diagnosis change, or new procedure performed?: [x] No    [] Yes (see summary sheet for update)  Medications: Verified on Patient Summary List    Subjective functional status/changes: The pt reports things are going well. Thinks the eccentric loading program has been helping a lot. Has not really had pain. OBJECTIVE      Therapeutic Procedures: Tx Min Billable or 1:1 Min (if diff from Tx Min) Procedure, Rationale, Specifics   38 49 [id number] Therapeutic Exercise (timed):  increase ROM, strength, coordination, balance, and proprioception to improve patient's ability to progress to PLOF and address remaining functional goals. (see flow sheet as applicable)     Details if applicable:     10 69 [id number] Manual Therapy (timed):  decrease pain, increase ROM, increase tissue extensibility, and decrease trigger points to improve patient's ability to progress to PLOF and address remaining functional goals.   The manual therapy interventions were

## 2023-08-01 ENCOUNTER — HOSPITAL ENCOUNTER (OUTPATIENT)
Dept: PHYSICAL THERAPY | Facility: HOSPITAL | Age: 33
Setting detail: RECURRING SERIES
Discharge: HOME | End: 2023-08-04
Attending: FAMILY MEDICINE
Payer: COMMERCIAL

## 2023-08-01 PROCEDURE — 97140 MANUAL THERAPY 1/> REGIONS: CPT | Performed by: PHYSICAL MEDICINE & REHABILITATION

## 2023-08-01 PROCEDURE — 97110 THERAPEUTIC EXERCISES: CPT | Performed by: PHYSICAL MEDICINE & REHABILITATION

## 2023-08-01 NOTE — PROGRESS NOTES
patient's ability to progress to PLOF and address remaining functional goals. The manual therapy interventions were performed at a separate and distinct time from the therapeutic activities interventions . (see flow sheet as applicable)    Details if applicable:  IASTM, MFR, and TPR along L gastroc-soleus complex; Grade IV AP TC mobs; Distal fibular head mobs; 1/2 kneel DF MWM with mob belt            43 38    Total Total     Other Objective/Functional Measures      Pain Level at end of session (0-10 scale): 1/10  CKC DF: L: 40 --> 43 R: 46    Assessment   Increased tightness/turgor within lateral>medial gastroc head which responded well to IASTM. Pt tolerated heavy slow resistance program well today without increased pain. Patient will continue to benefit from skilled PT / OT services to modify and progress therapeutic interventions, analyze and address functional mobility deficits, analyze and address ROM deficits, analyze and address strength deficits, analyze and address soft tissue restrictions, analyze and cue for proper movement patterns, and instruct in home and community integration to address functional deficits and attain remaining goals. Progress toward goals / Updated goals:  []  See Progress Note/Recertification    Progress towards goals / Updated goals:  Short and Long Term Goals:   Goals for this certification period to be accomplished in 3 weeks:  Pt will reduce hamstring pain to 0/10 to improve QOL and facilitate return to PLOF. - MET  Pt will improve gastroc flexibility by 5 degrees on R to improve foot biomechanics and decrease pain. MET     Goals for this certification period to be accomplished in 6 weeks:  Pt will increase hamstring and global hip strength to 5/5 with no pain to improve strength and prepare pt for return to running. Progressing   Pt will be able to run at least 2 miles without increase in ankle/hamstring pain.  - Progressing   Pt will improve hamstring flexibility by 5

## 2023-08-09 ENCOUNTER — HOSPITAL ENCOUNTER (OUTPATIENT)
Dept: PHYSICAL THERAPY | Facility: HOSPITAL | Age: 33
Setting detail: RECURRING SERIES
End: 2023-08-09
Attending: FAMILY MEDICINE
Payer: COMMERCIAL

## 2023-08-14 ENCOUNTER — APPOINTMENT (OUTPATIENT)
Dept: PHYSICAL THERAPY | Facility: HOSPITAL | Age: 33
End: 2023-08-14
Attending: FAMILY MEDICINE
Payer: COMMERCIAL

## 2023-08-16 ENCOUNTER — OFFICE VISIT (OUTPATIENT)
Age: 33
End: 2023-08-16

## 2023-08-16 VITALS
DIASTOLIC BLOOD PRESSURE: 80 MMHG | TEMPERATURE: 99.5 F | OXYGEN SATURATION: 98 % | BODY MASS INDEX: 23.53 KG/M2 | RESPIRATION RATE: 18 BRPM | WEIGHT: 164 LBS | SYSTOLIC BLOOD PRESSURE: 132 MMHG | HEART RATE: 82 BPM

## 2023-08-16 DIAGNOSIS — R05.1 ACUTE COUGH: Primary | ICD-10-CM

## 2023-08-16 LAB
Lab: NORMAL
QC PASS/FAIL: NORMAL
SARS-COV-2, POC: NORMAL
STREP PYOGENES DNA, POC: NEGATIVE
VALID INTERNAL CONTROL, POC: YES

## 2023-08-16 ASSESSMENT — ENCOUNTER SYMPTOMS
SORE THROAT: 1
RHINORRHEA: 0
COUGH: 0

## 2023-08-16 NOTE — PATIENT INSTRUCTIONS
Results for orders placed or performed in visit on 08/16/23   AMB POC STREP GO A DIRECT, DNA PROBE   Result Value Ref Range    Valid Internal Control, POC yes     Strep pyogenes DNA, POC Negative Negative

## 2023-08-21 ENCOUNTER — HOSPITAL ENCOUNTER (OUTPATIENT)
Dept: PHYSICAL THERAPY | Facility: HOSPITAL | Age: 33
Setting detail: RECURRING SERIES
Discharge: HOME | End: 2023-08-24
Attending: FAMILY MEDICINE
Payer: COMMERCIAL

## 2023-08-21 PROCEDURE — 97110 THERAPEUTIC EXERCISES: CPT | Performed by: PHYSICAL MEDICINE & REHABILITATION

## 2023-08-21 PROCEDURE — 97530 THERAPEUTIC ACTIVITIES: CPT | Performed by: PHYSICAL MEDICINE & REHABILITATION

## 2023-08-21 NOTE — PROGRESS NOTES
Continue plan of care  Re-Cert Due: 9/0/15  [x]  Upgrade activities as tolerated  []  Discharge due to :  []  Other:      Shania Parish, PT       8/21/2023       10:12 AM

## 2023-08-30 ENCOUNTER — APPOINTMENT (OUTPATIENT)
Dept: PHYSICAL THERAPY | Facility: HOSPITAL | Age: 33
End: 2023-08-30
Attending: FAMILY MEDICINE
Payer: COMMERCIAL

## 2023-09-06 ENCOUNTER — HOSPITAL ENCOUNTER (OUTPATIENT)
Dept: PHYSICAL THERAPY | Facility: HOSPITAL | Age: 33
Setting detail: RECURRING SERIES
Discharge: HOME | End: 2023-09-09
Attending: FAMILY MEDICINE
Payer: COMMERCIAL

## 2023-09-06 PROCEDURE — 97530 THERAPEUTIC ACTIVITIES: CPT | Performed by: PHYSICAL MEDICINE & REHABILITATION

## 2023-09-06 PROCEDURE — 97110 THERAPEUTIC EXERCISES: CPT | Performed by: PHYSICAL MEDICINE & REHABILITATION

## 2023-09-06 NOTE — PROGRESS NOTES
ability to progress to PLOF and address remaining functional goals. (see flow sheet as applicable)    Details if applicable:  squat jumps, SL jumps, running assessment/carmella training            38 38    Total Total     Other Objective/Functional Measures      Pain Level at end of session (0-10 scale): 0  Running carmella: 148     Assessment   Progressed running carmella from 148 baseline to 160 with utilization of metronome which improved running mechanics; encouraged Pt to use metronome independently for run/walk progression. Patient will continue to benefit from skilled PT / OT services to modify and progress therapeutic interventions, analyze and address functional mobility deficits, analyze and address ROM deficits, analyze and address strength deficits, analyze and address soft tissue restrictions, analyze and cue for proper movement patterns, and instruct in home and community integration to address functional deficits and attain remaining goals. Progress toward goals / Updated goals:  []  See Progress Note/Recertification    Progress towards goals / Updated goals:  Short and Long Term Goals:   Goals for this certification period to be accomplished in 3 weeks:  Pt will reduce hamstring pain to 0/10 to improve QOL and facilitate return to PLOF. - MET  Pt will improve gastroc flexibility by 5 degrees on R to improve foot biomechanics and decrease pain. MET     Goals for this certification period to be accomplished in 6 weeks:  Pt will increase hamstring and global hip strength to 5/5 with no pain to improve strength and prepare pt for return to running. Progressing   Pt will be able to run at least 2 miles without increase in ankle/hamstring pain. - Progressing   Pt will improve hamstring flexibility by 5 degrees to improve tissue extensibility and reduce strain.  MET  Pt will be able to run, cut, and jump without pain to facilitate safe return to basketball - Progressing    PLAN  Yes  Continue plan of

## 2023-09-12 ENCOUNTER — HOSPITAL ENCOUNTER (OUTPATIENT)
Dept: PHYSICAL THERAPY | Facility: HOSPITAL | Age: 33
Setting detail: RECURRING SERIES
Discharge: HOME | End: 2023-09-15
Attending: FAMILY MEDICINE
Payer: COMMERCIAL

## 2023-09-12 PROCEDURE — 97112 NEUROMUSCULAR REEDUCATION: CPT | Performed by: PHYSICAL MEDICINE & REHABILITATION

## 2023-09-12 PROCEDURE — 97110 THERAPEUTIC EXERCISES: CPT | Performed by: PHYSICAL MEDICINE & REHABILITATION

## 2023-09-12 NOTE — PROGRESS NOTES
to progress to PLOF and address remaining functional goals. (see flow sheet as applicable)    Details if applicable:  SWB runner, steamboats, sliding board lateral slides          43 38    Total Total     Other Objective/Functional Measures      Pain Level at end of session (0-10 scale): 0  Running carmella: 148     Assessment   Pt tolerated session well without any increased pain. Pt will progress to 165 bpm for running carmella. Will schedule one additional session in 2 weeks to continue return to run progression and monitor symptoms; will potentially discharge barring any set-backs. Patient will continue to benefit from skilled PT / OT services to modify and progress therapeutic interventions, analyze and address functional mobility deficits, analyze and address ROM deficits, analyze and address strength deficits, analyze and address soft tissue restrictions, analyze and cue for proper movement patterns, and instruct in home and community integration to address functional deficits and attain remaining goals. Progress toward goals / Updated goals:  []  See Progress Note/Recertification    Progress towards goals / Updated goals:  Short and Long Term Goals:   Goals for this certification period to be accomplished in 3 weeks:  Pt will reduce hamstring pain to 0/10 to improve QOL and facilitate return to PLOF. - MET  Pt will improve gastroc flexibility by 5 degrees on R to improve foot biomechanics and decrease pain. MET     Goals for this certification period to be accomplished in 6 weeks:  Pt will increase hamstring and global hip strength to 5/5 with no pain to improve strength and prepare pt for return to running. Progressing   Pt will be able to run at least 2 miles without increase in ankle/hamstring pain. - Progressing  Pt will improve hamstring flexibility by 5 degrees to improve tissue extensibility and reduce strain.  MET  Pt will be able to run, cut, and jump without pain to facilitate safe return to

## 2023-09-13 ENCOUNTER — APPOINTMENT (OUTPATIENT)
Dept: PHYSICAL THERAPY | Facility: HOSPITAL | Age: 33
End: 2023-09-13
Attending: FAMILY MEDICINE
Payer: COMMERCIAL

## 2023-09-13 ENCOUNTER — TELEMEDICINE (OUTPATIENT)
Facility: CLINIC | Age: 33
End: 2023-09-13
Payer: COMMERCIAL

## 2023-09-13 DIAGNOSIS — Z20.2 POSSIBLE EXPOSURE TO STD: ICD-10-CM

## 2023-09-13 DIAGNOSIS — Z20.2 POSSIBLE EXPOSURE TO STD: Primary | ICD-10-CM

## 2023-09-13 LAB
HIV 1+2 AB+HIV1 P24 AG SERPL QL IA: NONREACTIVE
HIV 1/2 RESULT COMMENT: NORMAL

## 2023-09-13 PROCEDURE — 99214 OFFICE O/P EST MOD 30 MIN: CPT | Performed by: FAMILY MEDICINE

## 2023-09-13 RX ORDER — DOXYCYCLINE HYCLATE 100 MG
100 TABLET ORAL 2 TIMES DAILY
Qty: 14 TABLET | Refills: 0 | Status: SHIPPED | OUTPATIENT
Start: 2023-09-13 | End: 2023-09-20

## 2023-09-13 ASSESSMENT — PATIENT HEALTH QUESTIONNAIRE - PHQ9
SUM OF ALL RESPONSES TO PHQ QUESTIONS 1-9: 0
SUM OF ALL RESPONSES TO PHQ QUESTIONS 1-9: 0
SUM OF ALL RESPONSES TO PHQ9 QUESTIONS 1 & 2: 0
1. LITTLE INTEREST OR PLEASURE IN DOING THINGS: 0
2. FEELING DOWN, DEPRESSED OR HOPELESS: 0
SUM OF ALL RESPONSES TO PHQ QUESTIONS 1-9: 0
SUM OF ALL RESPONSES TO PHQ QUESTIONS 1-9: 0

## 2023-09-13 NOTE — PROGRESS NOTES
Maryann Cevallos, was evaluated through a synchronous (real-time) audio-video encounter. The patient (or guardian if applicable) is aware that this is a billable service, which includes applicable co-pays. This Virtual Visit was conducted with patient's (and/or legal guardian's) consent. Patient identification was verified, and a caregiver was present when appropriate. The patient was located at Home: [address] [facility] [address] [facility]  Provider was located at [facility] (Appt Dept): [facility] [facility]  [address] [facility]      Maryann Cevallos (:  1990) is a Established patient, presenting virtually for evaluation of the following:    Assessment & Plan   Below is the assessment and plan developed based on review of pertinent history, physical exam, labs, studies, and medications. 1. Possible exposure to STD  -     CT+NG+M Genitalium by YONNY, Ur; Future  -     RPR; Future  -     HIV 1/2 Ag/Ab, 4TH Generation,W Rflx Confirm; Future  -     doxycycline hyclate (VIBRA-TABS) 100 MG tablet; Take 1 tablet by mouth 2 times daily for 7 days, Disp-14 tablet, R-0Normal    Return in about 4 weeks (around 10/11/2023). Subjective   Patient is a 43-year-old male who is seen today for possible exposure to chlamydia. Patient states that his partner that he has been having unprotected sex with has positive testing for chlamydia and is being treated. He would also like to get tested. Patient denies any symptoms, no pain with urination and no discharge. Review of Systems   All other systems reviewed and are negative.          Objective   Patient-Reported Vitals  No data recorded     Physical Exam  [INSTRUCTIONS:  \"[x]\" Indicates a positive item  \"[]\" Indicates a negative item  -- DELETE ALL ITEMS NOT EXAMINED]    Constitutional: [x] Appears well-developed and well-nourished [x] No apparent distress      [] Abnormal -     Mental status: [x] Alert and awake  [x] Oriented to person/place/time [x] Able to follow

## 2023-09-14 LAB — RPR SER QL: NONREACTIVE

## 2023-09-16 LAB
C TRACH RRNA UR QL NAA+PROBE: POSITIVE
M GENITALIUM DNA SPEC QL NAA+PROBE: NEGATIVE
N GONORRHOEA RRNA UR QL NAA+PROBE: NEGATIVE
SPECIMEN SOURCE: ABNORMAL

## 2023-10-06 ENCOUNTER — APPOINTMENT (OUTPATIENT)
Dept: PHYSICAL THERAPY | Facility: HOSPITAL | Age: 33
End: 2023-10-06
Attending: FAMILY MEDICINE
Payer: COMMERCIAL

## 2023-10-09 ENCOUNTER — HOSPITAL ENCOUNTER (OUTPATIENT)
Dept: PHYSICAL THERAPY | Facility: HOSPITAL | Age: 33
Setting detail: RECURRING SERIES
Discharge: HOME | End: 2023-10-12
Attending: FAMILY MEDICINE
Payer: COMMERCIAL

## 2023-10-09 PROCEDURE — 97535 SELF CARE MNGMENT TRAINING: CPT | Performed by: PHYSICAL MEDICINE & REHABILITATION

## 2023-10-09 PROCEDURE — 97110 THERAPEUTIC EXERCISES: CPT | Performed by: PHYSICAL MEDICINE & REHABILITATION

## 2023-10-09 NOTE — PROGRESS NOTES
Physical Therapy at [facility],   a part of [address] [place] [address] [address], [address]  Phone: [phone]  Fax: [phone]      DISCHARGE SUMMARY  Patient Name: Maryann Cevallos : 1990   Treatment/Medical Diagnosis: Neck pain [M54.2]  Trapezius muscle spasm [M62.838]  Hamstring tendonitis of right thigh [M76.891]   Referral Source: Osmin Phillip MD     Date of Initial Visit: 22 Attended Visits: 39 Missed Visits: 0     SUMMARY OF TREATMENT  Mr. Jennifer Burt was seen for a total of 39 skilled physical therapy visits secondary to bilateral foot pain, left achilles tendinitis, and right hamstring tendinitis. Pt has demonstrated good progress and no longer experiences any pain with walking or daily functional activities. He demonstrates full LE and foot/ankle ROM without pain. He has stalled on his return to run program several times over the course of his therapy program due to one of the above-listed issues flaring up on him. However, hamstring pain was resolved after dry needling. Pt was recently guided through an eccentric and heavy slow resistance loading program for his calf which resolved left achilles pain. He is now progressing through return-to-run program as he continues strengthening program independently. He is discharged from PT at this time as he has met or is progressing towards all therapy goals and has been provided a comprehensive HEP to further progress independently. Thank you for this referral!    CURRENT STATUS  Short and Long Term Goals:   Goals for this certification period to be accomplished in 3 weeks:  Pt will reduce hamstring pain to 0/10 to improve QOL and facilitate return to PLOF. - MET  Pt will improve gastroc flexibility by 5 degrees on R to improve foot biomechanics and decrease pain.  MET     Goals for this certification period to be accomplished in 6 weeks:  Pt will increase hamstring and global hip

## 2023-10-09 NOTE — PROGRESS NOTES
PHYSICAL THERAPY - MEDICARE DAILY TREATMENT NOTE (updated 3/23)      Date: 10/9/2023          Patient Name:  Ziggy Mari :  1990   Medical   Diagnosis:  Neck pain [M54.2]  Trapezius muscle spasm [M62.838]  Hamstring tendonitis of right thigh [M76.891] Treatment Diagnosis:  M79.651  Pain in right thigh and M79.671  RIGHT FOOT PAIN    Referral Source:  Toi Toro MD Insurance:   Payor: [address] [place] [address] Drive / Plan: Marie Mendoza / Product Type: *No Product type* /                     Patient  verified yes     Visit #   Current  / Total 39    Time   In / Out 1:45P 2:30P   Total Treatment Time 45   Total Timed Codes 45   1:1 Treatment Time 40      [facility] Totals Reminder:  bill using total billable   min of TIMED therapeutic procedures and modalities. 8-22 min = 1 unit; 23-37 min = 2 units; 38-52 min = 3 units; 53-67 min = 4 units; 68-82 min = 5 units          SUBJECTIVE    Pain Level (0-10 scale): 0    Any medication changes, allergies to medications, adverse drug reactions, diagnosis change, or new procedure performed?: [x] No    [] Yes (see summary sheet for update)  Medications: Verified on Patient Summary List    Subjective functional status/changes: The pt reports he was up to the 3rd day of the return-to-run program 3 weeks ago and felt some mild soreness along lateral posterior lower calf/achilles. He stopped the program because he got worried he was not strong enough. So he went back to his eccentric calf program and has not returned to run since. OBJECTIVE    Therapeutic Procedures: Tx Min Billable or 1:1 Min (if diff from Tx Min) Procedure, Rationale, Specifics   30 03 32457 Therapeutic Exercise (timed):  increase ROM, strength, coordination, balance, and proprioception to improve patient's ability to progress to PLOF and address remaining functional goals.  (see flow sheet as applicable)     Details if applicable:           15 15 [id number] Self Care/Home Management (timed):  improve patient

## 2023-10-30 ENCOUNTER — OFFICE VISIT (OUTPATIENT)
Facility: CLINIC | Age: 33
End: 2023-10-30
Payer: COMMERCIAL

## 2023-10-30 VITALS
SYSTOLIC BLOOD PRESSURE: 138 MMHG | WEIGHT: 174.5 LBS | BODY MASS INDEX: 24.98 KG/M2 | TEMPERATURE: 97.9 F | RESPIRATION RATE: 18 BRPM | HEIGHT: 70 IN | HEART RATE: 61 BPM | DIASTOLIC BLOOD PRESSURE: 86 MMHG | OXYGEN SATURATION: 100 %

## 2023-10-30 DIAGNOSIS — Z20.2 POSSIBLE EXPOSURE TO STD: ICD-10-CM

## 2023-10-30 DIAGNOSIS — M76.62 LEFT ACHILLES TENDINITIS: ICD-10-CM

## 2023-10-30 DIAGNOSIS — Z00.00 VISIT FOR WELL MAN HEALTH CHECK: Primary | ICD-10-CM

## 2023-10-30 DIAGNOSIS — B35.3 TINEA PEDIS OF BOTH FEET: ICD-10-CM

## 2023-10-30 LAB
ALBUMIN SERPL-MCNC: 4.3 G/DL (ref 3.5–5)
ALBUMIN/GLOB SERPL: 1.3 (ref 1.1–2.2)
ALP SERPL-CCNC: 54 U/L (ref 45–117)
ALT SERPL-CCNC: 38 U/L (ref 12–78)
ANION GAP SERPL CALC-SCNC: 5 MMOL/L (ref 5–15)
AST SERPL-CCNC: 24 U/L (ref 15–37)
BASOPHILS # BLD: 0 K/UL (ref 0–0.1)
BASOPHILS NFR BLD: 1 % (ref 0–1)
BILIRUB SERPL-MCNC: 0.8 MG/DL (ref 0.2–1)
BUN SERPL-MCNC: 11 MG/DL (ref 6–20)
BUN/CREAT SERPL: 10 (ref 12–20)
CALCIUM SERPL-MCNC: 9.2 MG/DL (ref 8.5–10.1)
CHLORIDE SERPL-SCNC: 106 MMOL/L (ref 97–108)
CHOLEST SERPL-MCNC: 200 MG/DL
CO2 SERPL-SCNC: 29 MMOL/L (ref 21–32)
CREAT SERPL-MCNC: 1.14 MG/DL (ref 0.7–1.3)
DIFFERENTIAL METHOD BLD: NORMAL
EOSINOPHIL # BLD: 0 K/UL (ref 0–0.4)
EOSINOPHIL NFR BLD: 1 % (ref 0–7)
ERYTHROCYTE [DISTWIDTH] IN BLOOD BY AUTOMATED COUNT: 12.9 % (ref 11.5–14.5)
GLOBULIN SER CALC-MCNC: 3.2 G/DL (ref 2–4)
GLUCOSE SERPL-MCNC: 98 MG/DL (ref 65–100)
HCT VFR BLD AUTO: 45.2 % (ref 36.6–50.3)
HDLC SERPL-MCNC: 73 MG/DL
HDLC SERPL: 2.7 (ref 0–5)
HGB BLD-MCNC: 14.6 G/DL (ref 12.1–17)
IMM GRANULOCYTES # BLD AUTO: 0 K/UL (ref 0–0.04)
IMM GRANULOCYTES NFR BLD AUTO: 0 % (ref 0–0.5)
LDLC SERPL CALC-MCNC: 109 MG/DL (ref 0–100)
LYMPHOCYTES # BLD: 1.8 K/UL (ref 0.8–3.5)
LYMPHOCYTES NFR BLD: 35 % (ref 12–49)
MCH RBC QN AUTO: 26.6 PG (ref 26–34)
MCHC RBC AUTO-ENTMCNC: 32.3 G/DL (ref 30–36.5)
MCV RBC AUTO: 82.5 FL (ref 80–99)
MONOCYTES # BLD: 0.3 K/UL (ref 0–1)
MONOCYTES NFR BLD: 6 % (ref 5–13)
NEUTS SEG # BLD: 2.9 K/UL (ref 1.8–8)
NEUTS SEG NFR BLD: 57 % (ref 32–75)
NRBC # BLD: 0 K/UL (ref 0–0.01)
NRBC BLD-RTO: 0 PER 100 WBC
PLATELET # BLD AUTO: 224 K/UL (ref 150–400)
PMV BLD AUTO: 10.4 FL (ref 8.9–12.9)
POTASSIUM SERPL-SCNC: 3.9 MMOL/L (ref 3.5–5.1)
PROT SERPL-MCNC: 7.5 G/DL (ref 6.4–8.2)
RBC # BLD AUTO: 5.48 M/UL (ref 4.1–5.7)
SODIUM SERPL-SCNC: 140 MMOL/L (ref 136–145)
TRIGL SERPL-MCNC: 90 MG/DL
VLDLC SERPL CALC-MCNC: 18 MG/DL
WBC # BLD AUTO: 5 K/UL (ref 4.1–11.1)

## 2023-10-30 PROCEDURE — 99214 OFFICE O/P EST MOD 30 MIN: CPT | Performed by: FAMILY MEDICINE

## 2023-10-30 PROCEDURE — 99395 PREV VISIT EST AGE 18-39: CPT | Performed by: FAMILY MEDICINE

## 2023-10-30 RX ORDER — TERBINAFINE HYDROCHLORIDE 250 MG/1
250 TABLET ORAL DAILY
Qty: 14 TABLET | Refills: 0 | Status: SHIPPED | OUTPATIENT
Start: 2023-10-30 | End: 2023-11-13

## 2023-10-30 RX ORDER — NAPROXEN 500 MG/1
500 TABLET ORAL 2 TIMES DAILY WITH MEALS
Qty: 60 TABLET | Refills: 3 | Status: SHIPPED | OUTPATIENT
Start: 2023-10-30

## 2023-10-30 ASSESSMENT — PATIENT HEALTH QUESTIONNAIRE - PHQ9
SUM OF ALL RESPONSES TO PHQ QUESTIONS 1-9: 0
SUM OF ALL RESPONSES TO PHQ QUESTIONS 1-9: 0
1. LITTLE INTEREST OR PLEASURE IN DOING THINGS: 0
SUM OF ALL RESPONSES TO PHQ9 QUESTIONS 1 & 2: 0
2. FEELING DOWN, DEPRESSED OR HOPELESS: 0
SUM OF ALL RESPONSES TO PHQ QUESTIONS 1-9: 0
SUM OF ALL RESPONSES TO PHQ QUESTIONS 1-9: 0

## 2023-10-30 NOTE — PROGRESS NOTES
Effects and Warnings were discussed with patient,  Patient Labs were reviewed and or requested, and  Patient Past Records were reviewed and or requested  yes     Pt agrees to call or return to clinic and/or go to closest ER with any worsening of symptoms. This may include, but not limited to increased fever (>100.4) with NSAIDS or Tylenol, increased edema, confusion, rash, worsening of presenting symptoms. Please note that this dictation was completed with [employer], the computer voice recognition software. Quite often unanticipated grammatical, syntax, homophones, and other interpretive errors are inadvertently transcribed by the computer software. Please disregard these errors. Please excuse any errors that have escaped final proofreading. Thank you.

## 2023-11-02 LAB
C TRACH RRNA UR QL NAA+PROBE: NEGATIVE
M GENITALIUM DNA SPEC QL NAA+PROBE: NEGATIVE
N GONORRHOEA RRNA UR QL NAA+PROBE: NEGATIVE
SPECIMEN SOURCE: NORMAL

## 2023-11-06 DIAGNOSIS — M76.62 LEFT ACHILLES TENDINITIS: Primary | ICD-10-CM

## 2023-11-07 DIAGNOSIS — M76.62 LEFT ACHILLES TENDINITIS: Primary | ICD-10-CM

## 2023-11-20 RX ORDER — TERBINAFINE HYDROCHLORIDE 250 MG/1
250 TABLET ORAL DAILY
Qty: 20 TABLET | Refills: 0 | Status: SHIPPED | OUTPATIENT
Start: 2023-11-20 | End: 2023-12-10

## 2024-04-04 ENCOUNTER — TELEMEDICINE (OUTPATIENT)
Facility: CLINIC | Age: 34
End: 2024-04-04
Payer: COMMERCIAL

## 2024-04-04 DIAGNOSIS — M25.511 ACUTE PAIN OF RIGHT SHOULDER: ICD-10-CM

## 2024-04-04 DIAGNOSIS — B00.1 COLD SORE: Primary | ICD-10-CM

## 2024-04-04 PROCEDURE — 99214 OFFICE O/P EST MOD 30 MIN: CPT | Performed by: FAMILY MEDICINE

## 2024-04-04 RX ORDER — VALACYCLOVIR HYDROCHLORIDE 1 G/1
2000 TABLET, FILM COATED ORAL 2 TIMES DAILY
Qty: 4 TABLET | Refills: 2 | Status: SHIPPED | OUTPATIENT
Start: 2024-04-04 | End: 2024-04-07

## 2024-04-04 NOTE — PROGRESS NOTES
Conrad Lyman, was evaluated through a synchronous (real-time) audio-video encounter. The patient (or guardian if applicable) is aware that this is a billable service, which includes applicable co-pays. This Virtual Visit was conducted with patient's (and/or legal guardian's) consent. Patient identification was verified, and a caregiver was present when appropriate.   The patient was located at Home: [address] [place] [address]  Provider was located at Facility (Appt Dept): [address] [place] [address]  Confirm you are appropriately licensed, registered, or certified to deliver care in the state where the patient is located as indicated above. If you are not or unsure, please re-schedule the visit: Yes, I confirm.     Conrad Lyman (:  1990) is a Established patient, presenting virtually for evaluation of the following:    Assessment & Plan   Below is the assessment and plan developed based on review of pertinent history, physical exam, labs, studies, and medications.  1. Cold sore  -     valACYclovir (VALTREX) 1 g tablet; Take 2 tablets by mouth 2 times daily for 3 days, Disp-4 tablet, R-2Normal  2. Acute pain of right shoulder  -     [facility] - Physical Therapy at the [facility]  -     XR SHOULDER RIGHT (MIN 2 VIEWS); Future    Return in about 6 weeks (around 2024) for Injury/Pain FU.       Subjective   Patient is a 33-year-old male with a few day history of cold sore left lower lip.  Patient states that he gets these twice to 3 times a year.  Previously prescribed valacyclovir and these usually go away completely.  Not under any current stress but states that he was feeling like he was coming down with an illness recently.  Patient also states that he has right shoulder pain the last few weeks.  He would like a physical therapy referral.  No known trauma but states that he cannot benchpress without anterior shoulder pain.  No popping or clicking noted,

## 2024-04-16 ENCOUNTER — OFFICE VISIT (OUTPATIENT)
Facility: CLINIC | Age: 34
End: 2024-04-16
Payer: COMMERCIAL

## 2024-04-16 VITALS
HEIGHT: 70 IN | RESPIRATION RATE: 16 BRPM | DIASTOLIC BLOOD PRESSURE: 93 MMHG | HEART RATE: 84 BPM | WEIGHT: 169 LBS | TEMPERATURE: 98.3 F | BODY MASS INDEX: 24.2 KG/M2 | SYSTOLIC BLOOD PRESSURE: 139 MMHG | OXYGEN SATURATION: 96 %

## 2024-04-16 DIAGNOSIS — L72.0 EPIDERMOID CYST OF SKIN OF SCROTUM: ICD-10-CM

## 2024-04-16 DIAGNOSIS — Z20.2 POSSIBLE EXPOSURE TO STD: ICD-10-CM

## 2024-04-16 DIAGNOSIS — E78.00 ELEVATED CHOLESTEROL: ICD-10-CM

## 2024-04-16 DIAGNOSIS — M25.511 ACUTE PAIN OF RIGHT SHOULDER: Primary | ICD-10-CM

## 2024-04-16 LAB
CHOLEST SERPL-MCNC: 194 MG/DL
HDLC SERPL-MCNC: 70 MG/DL
HDLC SERPL: 2.8 (ref 0–5)
HIV 1+2 AB+HIV1 P24 AG SERPL QL IA: NONREACTIVE
HIV 1/2 RESULT COMMENT: NORMAL
LDLC SERPL CALC-MCNC: 109.6 MG/DL (ref 0–100)
TRIGL SERPL-MCNC: 72 MG/DL
VLDLC SERPL CALC-MCNC: 14.4 MG/DL

## 2024-04-16 PROCEDURE — 99214 OFFICE O/P EST MOD 30 MIN: CPT | Performed by: FAMILY MEDICINE

## 2024-04-16 ASSESSMENT — PATIENT HEALTH QUESTIONNAIRE - PHQ9
SUM OF ALL RESPONSES TO PHQ QUESTIONS 1-9: 0
SUM OF ALL RESPONSES TO PHQ QUESTIONS 1-9: 0
1. LITTLE INTEREST OR PLEASURE IN DOING THINGS: NOT AT ALL
SUM OF ALL RESPONSES TO PHQ QUESTIONS 1-9: 0
2. FEELING DOWN, DEPRESSED OR HOPELESS: NOT AT ALL
SUM OF ALL RESPONSES TO PHQ9 QUESTIONS 1 & 2: 0
SUM OF ALL RESPONSES TO PHQ QUESTIONS 1-9: 0

## 2024-04-16 NOTE — PROGRESS NOTES
thyromegaly or thyroid nodules, no cervical lymphadenopathy  Pulmonary/Chest: clear to auscultation bilaterally- no wheezes, rales or rhonchi, normal air movement, no respiratory distress  Cardiovascular: normal rate, regular rhythm, normal S1 and S2, no murmurs, rubs, clicks, or gallops, distal pulses intact, no carotid bruits  Abdomen: soft, non-tender, non-distended, normal bowel sounds, no masses or organomegaly  Extremities: no cyanosis, clubbing or edema  Musculoskeletal: normal range of motion, no joint swelling, deformity or tenderness  Neurologic: reflexes normal and symmetric, no cranial nerve deficit, gait, coordination and speech normal     Assessment/ Plan:   Conrad was seen today for shoulder pain and cyst.    Diagnoses and all orders for this visit:    Acute pain of right shoulder    Epidermoid cyst of skin of scrotum  -     AFL - Baron Lake MD, Urology, [place] ([address])    Possible exposure to STD  -     HIV 1/2 Ag/Ab, 4TH Generation,W Rflx Confirm; Future  -     Chlamydia, Gonorrhea, Trichomoniasis; Future  -     HSV 1 and 2 Specific Ab, IgG; Future        Return in about 6 weeks (around 5/28/2024) for Injury/Pain FU.    Pathophysiology, recovery and rehabilitation process discussed and questions answered   Counseling for 30 Minutes of the total visit duration   Pictures and figures used as necessary   Provided reassurance   Monitor response to Physical Therapy   Recommend  lower impact activities-walking, Eliptical, Nordic Track, cycling or swimming   Follow up in 2 week(s)              I have discussed the diagnosis with the patient and the intended plan as seen in the above orders.  The patient has received an after-visit summary and questions were answered concerning future plans.     Medication Side Effects and Warnings were discussed with patient,  Patient Labs were reviewed and or requested, and  Patient Past Records were reviewed and or requested  yes     Pt agrees to call or

## 2024-04-17 LAB
HSV1 IGG SER IA-ACNC: >62.2 INDEX (ref 0–0.9)
HSV2 IGG SER IA-ACNC: <0.91 INDEX (ref 0–0.9)

## 2024-04-22 ENCOUNTER — HOSPITAL ENCOUNTER (OUTPATIENT)
Dept: PHYSICAL THERAPY | Facility: HOSPITAL | Age: 34
Setting detail: RECURRING SERIES
Discharge: HOME | End: 2024-04-25
Payer: COMMERCIAL

## 2024-04-22 PROCEDURE — 97161 PT EVAL LOW COMPLEX 20 MIN: CPT | Performed by: PHYSICAL MEDICINE & REHABILITATION

## 2024-04-22 PROCEDURE — 97110 THERAPEUTIC EXERCISES: CPT | Performed by: PHYSICAL MEDICINE & REHABILITATION

## 2024-04-22 NOTE — THERAPY EVALUATION
functional goals. (see flow sheet as applicable)    Details if applicable:                         8 8    Total Total         [x]  Patient Education billed concurrently with other procedures   [x] Review HEP    [] Progressed/Changed HEP, detail:    [] Other detail:         Pain Level at end of session (0-10 scale): 0    Plan of Care / Statement of Necessity for Physical Therapy Services     Assessment / key information:  Mr. Lyman is a very pleasant and motivated 33 year old male who was referred to skilled PT secondary to R shoulder pain. Based on examination, Pt presents with R pec > biceps tendinopathy along with associated postural dysfunction. He is a good candidate for PT.    Evaluation Complexity:  History:  LOW Complexity : Zero comorbidities / personal factors that will impact the outcome / POC; Examination:  HIGH Complexity : 4+ Standardized tests and measures addressing body structure, function, activity limitation and / or participation in recreation  ;Presentation:  LOW Complexity : Stable, uncomplicated  ;Clinical Decision Making:  MEDIUM Complexity : FOTO score of 26-74 Overall Complexity Rating: LOW   Problem List: pain affecting function, decrease ROM, decrease strength, decrease ADL/functional abilities, decrease activity tolerance, and decrease flexibility/joint mobility   Treatment Plan may include any combination of the followin Therapeutic Exercise, 19088 Neuromuscular Re-Education, 07011 Manual Therapy, 17991 Therapeutic Activity, 67956 Self Care/Home Management, 65895 Electrical Stim unattended, 88899 Electrical Stim attended, 63812 Vasopneumatic Device  (Vasopnuematic compression justification:  Per bilateral girth measures taken and listed above the edema is considered significant and having an impact on the patient's strength, self care, and ADL's), 92629 Ultrasound, and (Elective Self Pay) Needle Insertion w/o Injection (1 or 2 muscles), (3+ muscles)  Patient / Family readiness

## 2024-05-02 ENCOUNTER — HOSPITAL ENCOUNTER (OUTPATIENT)
Dept: PHYSICAL THERAPY | Facility: HOSPITAL | Age: 34
Setting detail: RECURRING SERIES
Discharge: HOME | End: 2024-05-05
Payer: COMMERCIAL

## 2024-05-02 PROCEDURE — 97112 NEUROMUSCULAR REEDUCATION: CPT | Performed by: PHYSICAL MEDICINE & REHABILITATION

## 2024-05-02 PROCEDURE — 97140 MANUAL THERAPY 1/> REGIONS: CPT | Performed by: PHYSICAL MEDICINE & REHABILITATION

## 2024-05-02 PROCEDURE — 97016 VASOPNEUMATIC DEVICE THERAPY: CPT | Performed by: PHYSICAL MEDICINE & REHABILITATION

## 2024-05-02 PROCEDURE — 97110 THERAPEUTIC EXERCISES: CPT | Performed by: PHYSICAL MEDICINE & REHABILITATION

## 2024-05-02 NOTE — PROGRESS NOTES
PHYSICAL THERAPY - MEDICARE DAILY TREATMENT NOTE (updated 3/23)      Date: 2024          Patient Name:  Conrad Lyman :  1990   Medical   Diagnosis:  Acute pain of right shoulder [M25.511] Treatment Diagnosis:  M25.511  RIGHT SHOULDER PAIN    Referral Source:  Waylon Burgess MD Insurance:   Payor: [facility] / Plan: [facility] / Product Type: *No Product type* /                     Patient  verified yes     Visit #   Current  / Total 2 20   Time   In / Out 5:36P 6:29P   Total Treatment Time 53   Total Timed Codes 43   1:1 Treatment Time 39      [facility] Totals Reminder:  bill using total billable   min of TIMED therapeutic procedures and modalities.   8-22 min = 1 unit; 23-37 min = 2 units; 38-52 min = 3 units; 53-67 min = 4 units; 68-82 min = 5 units            SUBJECTIVE    Pain Level (0-10 scale): 1    Any medication changes, allergies to medications, adverse drug reactions, diagnosis change, or new procedure performed?: [x] No    [] Yes (see summary sheet for update)  Medications: Verified on Patient Summary List    Subjective functional status/changes:     Pt reports he has been doing well.     OBJECTIVE      Therapeutic Procedures:  Tx Min Billable or 1:1 Min (if diff from Tx Min) Procedure, Rationale, Specifics   26 22 27779 Therapeutic Exercise (timed):  increase ROM, strength, coordination, balance, and proprioception to improve patient's ability to progress to PLOF and address remaining functional goals. (see flow sheet as applicable)     Details if applicable:     8 8 29562 Neuromuscular Re-Education (timed):  improve balance, coordination, kinesthetic sense, posture, core stability and proprioception to improve patient's ability to develop conscious control of individual muscles and awareness of position of extremities in order to progress to PLOF and address remaining functional goals. (see flow sheet as applicable)     Details if applicable:  Body blade; quadruped push-up plus   9 9

## 2024-05-06 ENCOUNTER — HOSPITAL ENCOUNTER (OUTPATIENT)
Dept: PHYSICAL THERAPY | Facility: HOSPITAL | Age: 34
Setting detail: RECURRING SERIES
Discharge: HOME | End: 2024-05-09
Payer: COMMERCIAL

## 2024-05-06 PROCEDURE — 97140 MANUAL THERAPY 1/> REGIONS: CPT | Performed by: PHYSICAL MEDICINE & REHABILITATION

## 2024-05-06 PROCEDURE — 97110 THERAPEUTIC EXERCISES: CPT | Performed by: PHYSICAL MEDICINE & REHABILITATION

## 2024-05-06 PROCEDURE — 97112 NEUROMUSCULAR REEDUCATION: CPT | Performed by: PHYSICAL MEDICINE & REHABILITATION

## 2024-05-06 RX ORDER — VALACYCLOVIR HYDROCHLORIDE 1 G/1
1000 TABLET, FILM COATED ORAL DAILY
Qty: 5 TABLET | Refills: 3 | Status: SHIPPED | OUTPATIENT
Start: 2024-05-06 | End: 2024-05-26

## 2024-05-06 NOTE — PROGRESS NOTES
pain.   Pt will will be able to perform >/= 10 push-ups with good form and without R shoulder pain during or after activity. - Progressing  Pt will be able to perform bench press with >/= 20 lbs dumbbells in each hand without R shoulder pain.   Pt will be able to play basketball without R shoulder pain.      PLAN  Yes  Continue plan of care  Re-Cert Due: 20 visits  [x]  Upgrade activities as tolerated  []  Discharge due to:  []  Other:      JULIOCESAR LEHMAN, PT       5/6/2024       7:14 AM

## 2024-05-14 ENCOUNTER — OFFICE VISIT (OUTPATIENT)
Facility: CLINIC | Age: 34
End: 2024-05-14
Payer: COMMERCIAL

## 2024-05-14 VITALS
HEART RATE: 78 BPM | TEMPERATURE: 98 F | BODY MASS INDEX: 24.08 KG/M2 | DIASTOLIC BLOOD PRESSURE: 80 MMHG | HEIGHT: 70 IN | RESPIRATION RATE: 16 BRPM | OXYGEN SATURATION: 100 % | SYSTOLIC BLOOD PRESSURE: 139 MMHG | WEIGHT: 168.2 LBS

## 2024-05-14 DIAGNOSIS — M76.61 RIGHT ACHILLES TENDINITIS: Primary | ICD-10-CM

## 2024-05-14 PROCEDURE — 99214 OFFICE O/P EST MOD 30 MIN: CPT | Performed by: FAMILY MEDICINE

## 2024-05-14 SDOH — ECONOMIC STABILITY: FOOD INSECURITY: WITHIN THE PAST 12 MONTHS, THE FOOD YOU BOUGHT JUST DIDN'T LAST AND YOU DIDN'T HAVE MONEY TO GET MORE.: NEVER TRUE

## 2024-05-14 SDOH — ECONOMIC STABILITY: INCOME INSECURITY: IN THE LAST 12 MONTHS, WAS THERE A TIME WHEN YOU WERE NOT ABLE TO PAY THE MORTGAGE OR RENT ON TIME?: NO

## 2024-05-14 SDOH — ECONOMIC STABILITY: INCOME INSECURITY: HOW HARD IS IT FOR YOU TO PAY FOR THE VERY BASICS LIKE FOOD, HOUSING, MEDICAL CARE, AND HEATING?: NOT HARD AT ALL

## 2024-05-14 SDOH — HEALTH STABILITY: PHYSICAL HEALTH: ON AVERAGE, HOW MANY DAYS PER WEEK DO YOU ENGAGE IN MODERATE TO STRENUOUS EXERCISE (LIKE A BRISK WALK)?: 3 DAYS

## 2024-05-14 SDOH — ECONOMIC STABILITY: FOOD INSECURITY: WITHIN THE PAST 12 MONTHS, YOU WORRIED THAT YOUR FOOD WOULD RUN OUT BEFORE YOU GOT MONEY TO BUY MORE.: NEVER TRUE

## 2024-05-14 SDOH — ECONOMIC STABILITY: HOUSING INSECURITY: IN THE LAST 12 MONTHS, HOW MANY PLACES HAVE YOU LIVED?: 1

## 2024-05-14 SDOH — HEALTH STABILITY: PHYSICAL HEALTH: ON AVERAGE, HOW MANY MINUTES DO YOU ENGAGE IN EXERCISE AT THIS LEVEL?: 90 MIN

## 2024-05-14 SDOH — ECONOMIC STABILITY: TRANSPORTATION INSECURITY
IN THE PAST 12 MONTHS, HAS THE LACK OF TRANSPORTATION KEPT YOU FROM MEDICAL APPOINTMENTS OR FROM GETTING MEDICATIONS?: NO

## 2024-05-14 ASSESSMENT — ANXIETY QUESTIONNAIRES
5. BEING SO RESTLESS THAT IT IS HARD TO SIT STILL: NOT AT ALL
4. TROUBLE RELAXING: NOT AT ALL
2. NOT BEING ABLE TO STOP OR CONTROL WORRYING: NOT AT ALL
GAD7 TOTAL SCORE: 0
1. FEELING NERVOUS, ANXIOUS, OR ON EDGE: NOT AT ALL
6. BECOMING EASILY ANNOYED OR IRRITABLE: NOT AT ALL
3. WORRYING TOO MUCH ABOUT DIFFERENT THINGS: NOT AT ALL
7. FEELING AFRAID AS IF SOMETHING AWFUL MIGHT HAPPEN: NOT AT ALL

## 2024-05-14 ASSESSMENT — SOCIAL DETERMINANTS OF HEALTH (SDOH)
HOW OFTEN DO YOU GET TOGETHER WITH FRIENDS OR RELATIVES?: MORE THAN THREE TIMES A WEEK
ARE YOU MARRIED, WIDOWED, DIVORCED, SEPARATED, NEVER MARRIED, OR LIVING WITH A PARTNER?: NEVER MARRIED
WITHIN THE LAST YEAR, HAVE TO BEEN RAPED OR FORCED TO HAVE ANY KIND OF SEXUAL ACTIVITY BY YOUR PARTNER OR EX-PARTNER?: NO
HOW OFTEN DO YOU ATTEND CHURCH OR RELIGIOUS SERVICES?: MORE THAN 4 TIMES PER YEAR
WITHIN THE LAST YEAR, HAVE YOU BEEN AFRAID OF YOUR PARTNER OR EX-PARTNER?: NO
WITHIN THE LAST YEAR, HAVE YOU BEEN KICKED, HIT, SLAPPED, OR OTHERWISE PHYSICALLY HURT BY YOUR PARTNER OR EX-PARTNER?: NO
WITHIN THE LAST YEAR, HAVE YOU BEEN HUMILIATED OR EMOTIONALLY ABUSED IN OTHER WAYS BY YOUR PARTNER OR EX-PARTNER?: NO
IN A TYPICAL WEEK, HOW MANY TIMES DO YOU TALK ON THE PHONE WITH FAMILY, FRIENDS, OR NEIGHBORS?: MORE THAN THREE TIMES A WEEK
HOW OFTEN DO YOU ATTENT MEETINGS OF THE CLUB OR ORGANIZATION YOU BELONG TO?: MORE THAN 4 TIMES PER YEAR
DO YOU BELONG TO ANY CLUBS OR ORGANIZATIONS SUCH AS CHURCH GROUPS UNIONS, FRATERNAL OR ATHLETIC GROUPS, OR SCHOOL GROUPS?: YES

## 2024-05-14 ASSESSMENT — LIFESTYLE VARIABLES
HOW OFTEN DO YOU HAVE A DRINK CONTAINING ALCOHOL: 2-4 TIMES A MONTH
HOW MANY STANDARD DRINKS CONTAINING ALCOHOL DO YOU HAVE ON A TYPICAL DAY: 1 OR 2

## 2024-05-14 ASSESSMENT — PATIENT HEALTH QUESTIONNAIRE - PHQ9
2. FEELING DOWN, DEPRESSED OR HOPELESS: NOT AT ALL
SUM OF ALL RESPONSES TO PHQ9 QUESTIONS 1 & 2: 0
SUM OF ALL RESPONSES TO PHQ QUESTIONS 1-9: 0
SUM OF ALL RESPONSES TO PHQ QUESTIONS 1-9: 0
1. LITTLE INTEREST OR PLEASURE IN DOING THINGS: NOT AT ALL
SUM OF ALL RESPONSES TO PHQ QUESTIONS 1-9: 0
SUM OF ALL RESPONSES TO PHQ QUESTIONS 1-9: 0

## 2024-05-14 NOTE — PROGRESS NOTES
non-tender  PTFL:  non-tender  Syndesmosis Ligament:  non-tender  Deltoid ligament:  non-tender  Posterior Tibialis Tendon:  non-tender  Peroneal Tendon: non-tender  Achilles Tendon:  tender     Proximal Fibula:  non-tender  Proximal Tibia:  non-tender  Distal Fibula:  non-tender  Distal Tibia:  non-tender    Plantar Fascia: non-tender  Midfoot:  non-tender  Forefoot:  non-tender    ROM:  Plantar Flexion:  normal  Dorsiflexion:  normal    Squeeze Test: negative  Talar Tilt Test:  negative  Anterior Drawer:negative    Kleiger Test:  negative  Hop Test: Not done  Philpot: negative    Neurologic: gait and coordination normal and speech normal   Indications for study: Right Achilles pain  Limited musculoskeletal ultrasound examination was performed on the posterior right] ankle with the following findings: Retrocalcaneal area - long: normal echogenic, linearly compact fibrillar birds-beak appearance to Achilles tendon insertion; no retrocalcaneal bursitis   Retrocalcaneal area - trans:  normal echogenic, tessellate compact fibrillar appearance to Achilles tendon insertion; no retrocalcaneal bursitis   Achilles tendon - long:  normal echogenic, linearly compact fibrillar appearance, small spur noted at insertion  Achilles tendon - trans:  normal echogenic, tessellate compact fibrillar pancake-like appearance with small focus of hypoechoic area noted laterally    Impression: Mild calcaneal spur, mild tendinosis of lateral Achilles about 5 cm up from insertion    Scanned and Interpreted by:  [facility]   Assessment/ Plan:   Conrad was seen today for pain.    Diagnoses and all orders for this visit:    Right Achilles tendinitis  -     AMB POC US,EXTREMITY,NONVASCULAR,REAL-TIME IMAGE,LIMITED    Other orders  -     [facility] - Physical Therapy at the [facility]        Return in about 6 weeks (around 6/25/2024), or if symptoms worsen or fail to improve, for Injury/Pain

## 2024-05-17 ENCOUNTER — APPOINTMENT (OUTPATIENT)
Dept: PHYSICAL THERAPY | Facility: HOSPITAL | Age: 34
End: 2024-05-17
Payer: COMMERCIAL

## 2024-05-22 ENCOUNTER — HOSPITAL ENCOUNTER (OUTPATIENT)
Dept: PHYSICAL THERAPY | Facility: HOSPITAL | Age: 34
Setting detail: RECURRING SERIES
Discharge: HOME | End: 2024-05-25
Payer: COMMERCIAL

## 2024-05-22 PROCEDURE — 97110 THERAPEUTIC EXERCISES: CPT | Performed by: PHYSICAL MEDICINE & REHABILITATION

## 2024-05-22 NOTE — PROGRESS NOTES
[facility] at [facility],   a part of [address], [address]  [place] [address]  Phone: [phone]  Fax: [phone]      PHYSICAL THERAPY PROGRESS NOTE  Patient Name:  Conrad Lyman :  1990   Treatment/Medical Diagnosis: Acute pain of right shoulder [M25.511]  Achilles tendinitis, right leg [M76.61]   Referral Source:  Waylon Burgess MD     Date of Initial Visit:  24 Attended Visits:  4 Missed Visits:  1     SUMMARY OF TREATMENT/ASSESSMENT:  Mr. Lyman has been seen for 4 skilled physical therapy visits secondary to acute right shoulder pain and, more recently, right achilles tendinitis. Pt was assessed today regarding right achilles tendinitis and Pt presents with associated impairments including TTP along mid-proximal R achilles and mildly decreased R DF ROM.  Pt progressing well with his shoulder PT program and was able to perform push-ups and bench press with dumbbells without R shoulder pain in clinic today.   Pt would benefit from additional therapy to facilitate return to PLOF with minimized restriction or pain. Thank you for this referral!    CURRENT STATUS/GOALS:    FOTO score: 101 (65 on eval)    R ankle ROM:                         PROM              DF                               +9 (+13)              PF                                WNL     LOWER QUARTER                            MUSCLE STRENGTH  KEY                                                                             R                      L  0 - No Contraction                   Knee ext                      --                      --  1 - Trace                                           flex                     --                      --  2 - Poor                                   Hip ext                         --                      --  3 - Fair                                           flex                         --                      --  4 -

## 2024-05-22 NOTE — PROGRESS NOTES
PHYSICAL THERAPY - MEDICARE DAILY TREATMENT NOTE (updated 3/23)      Date: 2024          Patient Name:  Conrad Lyman :  1990   Medical   Diagnosis:  Acute pain of right shoulder [M25.511]  Achilles tendinitis, right leg [M76.61] Treatment Diagnosis:  M25.511  RIGHT SHOULDER PAIN    Referral Source:  Waylon Burgess MD Insurance:   Payor: [facility] / Plan: [facility] / Product Type: *No Product type* /                     Patient  verified yes     Visit #   Current  / Total 4 20   Time   In / Out 12:50P 1:35P   Total Treatment Time 45   Total Timed Codes 45   1:1 Treatment Time 45      [facility] Totals Reminder:  bill using total billable   min of TIMED therapeutic procedures and modalities.   8-22 min = 1 unit; 23-37 min = 2 units; 38-52 min = 3 units; 53-67 min = 4 units; 68-82 min = 5 units            SUBJECTIVE    Pain Level (0-10 scale): 2    Any medication changes, allergies to medications, adverse drug reactions, diagnosis change, or new procedure performed?: [x] No    [] Yes (see summary sheet for update)  Medications: Verified on Patient Summary List    Subjective functional status/changes:     Pt reports his R calf feels a lot better than 2 weeks ago. Prolonged walking and running can provoke pain. The shoulder has been good, no issues.   Ultrasound via Dr. Burgess: Impression: Mild calcaneal spur, mild tendinosis of lateral Achilles about 5 cm up from insertion     OBJECTIVE    Therapeutic Procedures:  Tx Min Billable or 1:1 Min (if diff from Tx Min) Procedure, Rationale, Specifics   37 [id number] Therapeutic Exercise (timed):  increase ROM, strength, coordination, balance, and proprioception to improve patient's ability to progress to PLOF and address remaining functional goals. (see flow sheet as applicable)     Details if applicable:  Assessment of current status   8 8 [id number] Manual Therapy (timed):  decrease pain, increase ROM, and increase tissue extensibility to improve patient's ability to

## 2024-06-05 ENCOUNTER — HOSPITAL ENCOUNTER (OUTPATIENT)
Dept: PHYSICAL THERAPY | Facility: HOSPITAL | Age: 34
Setting detail: RECURRING SERIES
Discharge: HOME | End: 2024-06-08
Payer: COMMERCIAL

## 2024-06-05 PROCEDURE — 97140 MANUAL THERAPY 1/> REGIONS: CPT | Performed by: PHYSICAL MEDICINE & REHABILITATION

## 2024-06-05 PROCEDURE — 97112 NEUROMUSCULAR REEDUCATION: CPT | Performed by: PHYSICAL MEDICINE & REHABILITATION

## 2024-06-05 PROCEDURE — 97110 THERAPEUTIC EXERCISES: CPT | Performed by: PHYSICAL MEDICINE & REHABILITATION

## 2024-06-05 NOTE — PROGRESS NOTES
care  Re-Cert Due: 20 visits  [x]  Upgrade activities as tolerated  []  Discharge due to:  []  Other:      JULIOCESAR LEHMAN, PT       6/5/2024       11:38 AM

## 2024-06-25 ENCOUNTER — HOSPITAL ENCOUNTER (OUTPATIENT)
Dept: PHYSICAL THERAPY | Facility: HOSPITAL | Age: 34
Setting detail: RECURRING SERIES
End: 2024-06-25
Payer: COMMERCIAL

## 2024-07-01 ENCOUNTER — HOSPITAL ENCOUNTER (OUTPATIENT)
Dept: PHYSICAL THERAPY | Facility: HOSPITAL | Age: 34
Setting detail: RECURRING SERIES
Discharge: HOME | End: 2024-07-04
Payer: COMMERCIAL

## 2024-07-01 PROCEDURE — 97110 THERAPEUTIC EXERCISES: CPT | Performed by: PHYSICAL MEDICINE & REHABILITATION

## 2024-07-01 PROCEDURE — 97112 NEUROMUSCULAR REEDUCATION: CPT | Performed by: PHYSICAL MEDICINE & REHABILITATION

## 2024-07-01 NOTE — PROGRESS NOTES
Physical Therapy at [facility],   a part of [address], [address]  [place] [address]  Phone: [phone]  Fax: [phone]      PHYSICAL THERAPY PROGRESS NOTE  Patient Name:  Conrad Lyman :  1990   Treatment/Medical Diagnosis: Acute pain of right shoulder [M25.511]  Achilles tendinitis, right leg [M76.61]   Referral Source:  Waylon Burgess MD     Date of Initial Visit:  24 Attended Visits:  6 Missed Visits:  0     SUMMARY OF TREATMENT/ASSESSMENT:  Mr. Lyman has been seen for 6 skilled physical therapy visits secondary to acute right shoulder pain and right achilles tendinitis. Pt is progressing well with his therapy program. He is tolerating his walk/run return-to-run protocol well without any achilles pain. He does still note mild intermittent anterior right shoulder pain, but has been able to perform his full exercise regimen without restriction. He would benefit from a few additional sessions to further emphasize core/trunk stabilization training to facilitate more normalized movement through entire kinetic chain and minimize risk of long-term or recurrent pain. Thank you for this referral!    CURRENT STATUS/GOALS:    Short and Long Term Goals: To be accomplished in 20 treatments.  Pt will be independent and compliant with HEP. - MET  Pt will increase his FOTO score by the MCID from 65 to >/= 75 demonstrating improved overall function with decreased pain. - MET (101)  Pt will will be able to perform >/= 10 push-ups with good form and without R shoulder pain during or after activity. - MET  Pt will be able to perform bench press with >/= 20 lbs dumbbells in each hand without R shoulder pain. - MET  Pt will be able to play basketball without R shoulder pain. - Has not attempted      RECOMMENDATIONS FOR SKILLED THERAPY:  He would benefit from a few additional sessions to further emphasize core/trunk stabilization training to

## 2024-07-01 NOTE — PROGRESS NOTES
PHYSICAL THERAPY - MEDICARE DAILY TREATMENT NOTE (updated 3/23)      Date: 2024          Patient Name:  Conrad Lyman :  1990   Medical   Diagnosis:  Acute pain of right shoulder [M25.511]  Achilles tendinitis, right leg [M76.61] Treatment Diagnosis:  M25.511  RIGHT SHOULDER PAIN    Referral Source:  Waylon Burgess MD Insurance:   Payor: [facility] / Plan: [facility] / Product Type: *No Product type* /                     Patient  verified yes     Visit #   Current  / Total 6 20   Time   In / Out 10:18A 11:10A   Total Treatment Time 52   Total Timed Codes 52   1:1 Treatment Time 38       BC Totals Reminder:  bill using total billable   min of TIMED therapeutic procedures and modalities.   8-22 min = 1 unit; 23-37 min = 2 units; 38-52 min = 3 units; 53-67 min = 4 units; 68-82 min = 5 units            SUBJECTIVE    Pain Level (0-10 scale): 2    Any medication changes, allergies to medications, adverse drug reactions, diagnosis change, or new procedure performed?: [x] No    [] Yes (see summary sheet for update)  Medications: Verified on Patient Summary List    Subjective functional status/changes:     Pt reports some mild anterior right shoulder discomfort, but has been able to push through and go to the gym without restriction or pain.  Achilles have been good bilaterally. He has been walking 5 minutes/running 1 minutes x 5 cycles without pain. He has noticed some R proximal HS soreness, but does not notice it with running, moreso just tenderness upon palpation.    OBJECTIVE    Therapeutic Procedures:  Tx Min Billable or 1:1 Min (if diff from Tx Min) Procedure, Rationale, Specifics   40 [phone] [id number] Therapeutic Exercise (timed):  increase ROM, strength, coordination, balance, and proprioception to improve patient's ability to progress to PLOF and address remaining functional goals. (see flow sheet as applicable)    Details if applicable:  Assessment of current status         12 12 [id number] Neuromuscular

## 2024-07-08 ENCOUNTER — HOSPITAL ENCOUNTER (OUTPATIENT)
Dept: PHYSICAL THERAPY | Facility: HOSPITAL | Age: 34
Setting detail: RECURRING SERIES
End: 2024-07-08
Payer: COMMERCIAL

## 2024-07-11 DIAGNOSIS — M76.891 HAMSTRING TENDONITIS OF RIGHT THIGH: Primary | ICD-10-CM

## 2024-07-11 DIAGNOSIS — M25.511 ACUTE PAIN OF RIGHT SHOULDER: ICD-10-CM

## 2024-08-27 RX ORDER — VALACYCLOVIR HYDROCHLORIDE 1 G/1
1000 TABLET, FILM COATED ORAL DAILY
Qty: 5 TABLET | Refills: 0 | Status: SHIPPED | OUTPATIENT
Start: 2024-08-27 | End: 2024-08-29 | Stop reason: SDUPTHER

## 2024-08-28 ENCOUNTER — OFFICE VISIT (OUTPATIENT)
Facility: CLINIC | Age: 34
End: 2024-08-28
Payer: COMMERCIAL

## 2024-08-28 VITALS
RESPIRATION RATE: 16 BRPM | BODY MASS INDEX: 23.86 KG/M2 | HEIGHT: 70 IN | WEIGHT: 166.7 LBS | DIASTOLIC BLOOD PRESSURE: 75 MMHG | SYSTOLIC BLOOD PRESSURE: 109 MMHG | TEMPERATURE: 97.8 F | OXYGEN SATURATION: 98 % | HEART RATE: 78 BPM

## 2024-08-28 DIAGNOSIS — E78.00 ELEVATED CHOLESTEROL: ICD-10-CM

## 2024-08-28 DIAGNOSIS — Z20.2 POSSIBLE EXPOSURE TO STD: ICD-10-CM

## 2024-08-28 DIAGNOSIS — Z20.2 POSSIBLE EXPOSURE TO STD: Primary | ICD-10-CM

## 2024-08-28 LAB
CHOLEST SERPL-MCNC: 190 MG/DL
HDLC SERPL-MCNC: 65 MG/DL
HDLC SERPL: 2.9 (ref 0–5)
HIV 1+2 AB+HIV1 P24 AG SERPL QL IA: NONREACTIVE
HIV 1/2 RESULT COMMENT: NORMAL
LDLC SERPL CALC-MCNC: 107 MG/DL (ref 0–100)
RPR SER QL: NONREACTIVE
TRIGL SERPL-MCNC: 90 MG/DL
VLDLC SERPL CALC-MCNC: 18 MG/DL

## 2024-08-28 PROCEDURE — 99213 OFFICE O/P EST LOW 20 MIN: CPT | Performed by: FAMILY MEDICINE

## 2024-08-28 SDOH — ECONOMIC STABILITY: FOOD INSECURITY: WITHIN THE PAST 12 MONTHS, YOU WORRIED THAT YOUR FOOD WOULD RUN OUT BEFORE YOU GOT MONEY TO BUY MORE.: NEVER TRUE

## 2024-08-28 SDOH — ECONOMIC STABILITY: FOOD INSECURITY: WITHIN THE PAST 12 MONTHS, THE FOOD YOU BOUGHT JUST DIDN'T LAST AND YOU DIDN'T HAVE MONEY TO GET MORE.: NEVER TRUE

## 2024-08-28 SDOH — ECONOMIC STABILITY: INCOME INSECURITY: HOW HARD IS IT FOR YOU TO PAY FOR THE VERY BASICS LIKE FOOD, HOUSING, MEDICAL CARE, AND HEATING?: NOT HARD AT ALL

## 2024-08-28 ASSESSMENT — PATIENT HEALTH QUESTIONNAIRE - PHQ9
2. FEELING DOWN, DEPRESSED OR HOPELESS: NOT AT ALL
SUM OF ALL RESPONSES TO PHQ9 QUESTIONS 1 & 2: 0
1. LITTLE INTEREST OR PLEASURE IN DOING THINGS: NOT AT ALL
SUM OF ALL RESPONSES TO PHQ QUESTIONS 1-9: 0

## 2024-08-28 ASSESSMENT — ANXIETY QUESTIONNAIRES
5. BEING SO RESTLESS THAT IT IS HARD TO SIT STILL: NOT AT ALL
2. NOT BEING ABLE TO STOP OR CONTROL WORRYING: NOT AT ALL
7. FEELING AFRAID AS IF SOMETHING AWFUL MIGHT HAPPEN: NOT AT ALL
4. TROUBLE RELAXING: NOT AT ALL
6. BECOMING EASILY ANNOYED OR IRRITABLE: NOT AT ALL
3. WORRYING TOO MUCH ABOUT DIFFERENT THINGS: NOT AT ALL
IF YOU CHECKED OFF ANY PROBLEMS ON THIS QUESTIONNAIRE, HOW DIFFICULT HAVE THESE PROBLEMS MADE IT FOR YOU TO DO YOUR WORK, TAKE CARE OF THINGS AT HOME, OR GET ALONG WITH OTHER PEOPLE: NOT DIFFICULT AT ALL
GAD7 TOTAL SCORE: 0
1. FEELING NERVOUS, ANXIOUS, OR ON EDGE: NOT AT ALL

## 2024-08-28 NOTE — PROGRESS NOTES
(CARDIA)     Difficulty of Paying Living Expenses: Not hard at all   Food Insecurity: No Food Insecurity (8/28/2024)    Hunger Vital Sign     Worried About Running Out of Food in the Last Year: Never true     Ran Out of Food in the Last Year: Never true   Transportation Needs: No Transportation Needs (8/28/2024)    PRAPARE - Transportation     Lack of Transportation (Medical): No     Lack of Transportation (Non-Medical): No   Physical Activity: Sufficiently Active (5/14/2024)    Exercise Vital Sign     Days of Exercise per Week: 3 days     Minutes of Exercise per Session: 90 min   Stress: No Stress Concern Present (5/14/2024)    [employer] - Occupational Stress Questionnaire     Feeling of Stress : Not at all   Social Connections: Moderately Integrated (5/14/2024)    Social Connection and Isolation Panel [NHANES]     Frequency of Communication with Friends and Family: More than three times a week     Frequency of Social Gatherings with Friends and Family: More than three times a week     Attends Quaker Services: More than 4 times per year     Active Member of Clubs or Organizations: Yes     Attends Club or Organization Meetings: More than 4 times per year     Marital Status: Never    Intimate Partner Violence: Not At Risk (5/14/2024)    Humiliation, Afraid, Rape, and Kick questionnaire     Fear of Current or Ex-Partner: No     Emotionally Abused: No     Physically Abused: No     Sexually Abused: No   Housing Stability: Unknown (8/28/2024)    Housing Stability Vital Sign     Unable to Pay for Housing in the Last Year: No     Homeless in the Last Year: No        Review of Systems - negative except as listed above      Objective:     Vitals:    08/28/24 0842   BP: 109/75   Site: Right Upper Arm   Position: Sitting   Cuff Size: Large Adult   Pulse: 78   Resp: 16   Temp: 97.8 °F (36.6 °C)   TempSrc: Oral   SpO2: 98%   Weight: 75.6 kg (166 lb 11.2 oz)   Height: 1.778 m (5' 10\")        Physical Examination: General appearance - alert, well appearing, and in no distress  Eyes - pupils equal and reactive, extraocular eye movements intact  Ears - bilateral TM's and external ear canals normal  Nose - normal and patent, no erythema, discharge or polyps  Mouth - mucous membranes moist, pharynx normal without lesions  Neck - supple, no significant adenopathy  Chest - clear to auscultation, no wheezes, rales or rhonchi, symmetric air entry  Heart - normal rate, regular rhythm, normal S1, S2, no murmurs, rubs, clicks or gallops  Abdomen - soft, nontender, nondistended, no masses or organomegaly  Musculoskeletal - no joint tenderness, deformity or swelling  Extremities - peripheral pulses normal, no pedal edema, no clubbing or cyanosis  Skin - normal coloration and turgor, no rashes, no suspicious skin lesions noted   Physical Exam      Results      Assessment/ Plan:   Conrad was seen today for follow-up and sexually transmitted diseases.    Diagnoses and all orders for this visit:    Possible exposure to STD  -     CT+NG+M Genitalium by YONNY, Ur; Future  -     RPR; Future  -     HIV 1/2 Ag/Ab, 4TH Generation,W Rflx Confirm; Future  -     HSV 1 and 2 Specific Ab, IgG; Future    Elevated cholesterol  -     Lipid Panel; Future      Assessment & Plan  1. STD Screening.  He is concerned about his sexual activity and has been prescribed valacyclovir for an outbreak. He was informed that there are refills available for the medication. STD testing will be conducted, including testing for genital herpes (HSV-1).    2. Gastrointestinal Discomfort.  He reported stomach issues potentially related to the consumption of apple cider vinegar and spicy foods. He is advised to monitor his symptoms and consider a month-long trial of over-the-counter antacid, such as Pepcid, to help heal any potential ulcers. If symptoms persist, a referral to a gastroenterologist will be considered.        No follow-ups on

## 2024-08-29 LAB
HSV1 IGG SER IA-ACNC: >62.2 INDEX (ref 0–0.9)
HSV2 IGG SER IA-ACNC: <0.91 INDEX (ref 0–0.9)

## 2024-08-29 RX ORDER — VALACYCLOVIR HYDROCHLORIDE 1 G/1
1000 TABLET, FILM COATED ORAL DAILY
Qty: 5 TABLET | Refills: 0 | Status: SHIPPED | OUTPATIENT
Start: 2024-08-29 | End: 2024-09-03

## 2024-09-18 DIAGNOSIS — R10.9 STOMACH PAIN: Primary | ICD-10-CM

## 2024-10-04 ENCOUNTER — OFFICE VISIT (OUTPATIENT)
Facility: CLINIC | Age: 34
End: 2024-10-04
Payer: COMMERCIAL

## 2024-10-04 VITALS
WEIGHT: 163 LBS | DIASTOLIC BLOOD PRESSURE: 85 MMHG | BODY MASS INDEX: 23.34 KG/M2 | RESPIRATION RATE: 16 BRPM | OXYGEN SATURATION: 97 % | TEMPERATURE: 98.4 F | SYSTOLIC BLOOD PRESSURE: 128 MMHG | HEIGHT: 70 IN | HEART RATE: 100 BPM

## 2024-10-04 DIAGNOSIS — R10.9 ABDOMINAL PAIN, UNSPECIFIED ABDOMINAL LOCATION: Primary | ICD-10-CM

## 2024-10-04 DIAGNOSIS — K21.9 GASTROESOPHAGEAL REFLUX DISEASE WITHOUT ESOPHAGITIS: ICD-10-CM

## 2024-10-04 LAB
ALBUMIN SERPL-MCNC: 4.4 G/DL (ref 3.5–5)
ALBUMIN/GLOB SERPL: 1.4 (ref 1.1–2.2)
ALP SERPL-CCNC: 53 U/L (ref 45–117)
ALT SERPL-CCNC: 24 U/L (ref 12–78)
ANION GAP SERPL CALC-SCNC: 5 MMOL/L (ref 2–12)
AST SERPL-CCNC: 23 U/L (ref 15–37)
BILIRUB SERPL-MCNC: 0.9 MG/DL (ref 0.2–1)
BUN SERPL-MCNC: 13 MG/DL (ref 6–20)
BUN/CREAT SERPL: 11 (ref 12–20)
CALCIUM SERPL-MCNC: 9.8 MG/DL (ref 8.5–10.1)
CHLORIDE SERPL-SCNC: 105 MMOL/L (ref 97–108)
CO2 SERPL-SCNC: 27 MMOL/L (ref 21–32)
CREAT SERPL-MCNC: 1.15 MG/DL (ref 0.7–1.3)
GLOBULIN SER CALC-MCNC: 3.2 G/DL (ref 2–4)
GLUCOSE SERPL-MCNC: 97 MG/DL (ref 65–100)
POTASSIUM SERPL-SCNC: 3.7 MMOL/L (ref 3.5–5.1)
PROT SERPL-MCNC: 7.6 G/DL (ref 6.4–8.2)
SODIUM SERPL-SCNC: 137 MMOL/L (ref 136–145)

## 2024-10-04 PROCEDURE — 99214 OFFICE O/P EST MOD 30 MIN: CPT | Performed by: FAMILY MEDICINE

## 2024-10-04 RX ORDER — OMEPRAZOLE 40 MG/1
40 CAPSULE, DELAYED RELEASE ORAL
Qty: 30 CAPSULE | Refills: 0 | Status: SHIPPED | OUTPATIENT
Start: 2024-10-04

## 2024-10-04 NOTE — PROGRESS NOTES
Chief Complaint   Patient presents with    GI Problem     he is a 34 y.o. year old male who presents for evaluation of stomach aches and Urgent care follow up. Patient states he has a stomach ache for months. Patient also states he went to [facility] for chest and arm pain. Patient states they did a EKG and it was normal. Per patient he notice chest pain after eating tacos and having sauce.     Reviewed and agree with Nurse Note and duplicated in this note.  Reviewed PmHx, RxHx, FmHx, SocHx, AllgHx and updated and dated in the chart.    Family History   Problem Relation Age of Onset    Cirrhosis Father     Diabetes Maternal Grandmother        Past Medical History:   Diagnosis Date    ADHD (attention deficit hyperactivity disorder)       Social History     Socioeconomic History    Marital status: Single    Number of children: 0    Highest education level: Master's degree (e.g., MA, MS, Jolene, MEd, MSW, BACILIO)   Tobacco Use    Smoking status: Never     Passive exposure: Never    Smokeless tobacco: Never   Vaping Use    Vaping status: Never Used   Substance and Sexual Activity    Alcohol use: Yes    Drug use: No    Sexual activity: Yes     Partners: Female   Social History Narrative    Master's Degree. Single with no children.      Social Determinants of Health     Financial Resource Strain: Low Risk  (8/28/2024)    Overall Financial Resource Strain (CARDIA)     Difficulty of Paying Living Expenses: Not hard at all   Food Insecurity: No Food Insecurity (8/28/2024)    Hunger Vital Sign     Worried About Running Out of Food in the Last Year: Never true     Ran Out of Food in the Last Year: Never true   Transportation Needs: No Transportation Needs (8/28/2024)    PRAPARE - Transportation     Lack of Transportation (Medical): No     Lack of Transportation (Non-Medical): No   Physical Activity: Sufficiently Active (5/14/2024)    Exercise Vital Sign     Days of Exercise per Week: 3 days     Minutes of Exercise per Session:

## 2024-10-28 ENCOUNTER — OFFICE VISIT (OUTPATIENT)
Facility: CLINIC | Age: 34
End: 2024-10-28
Payer: COMMERCIAL

## 2024-10-28 VITALS
HEIGHT: 70 IN | DIASTOLIC BLOOD PRESSURE: 85 MMHG | BODY MASS INDEX: 23.22 KG/M2 | HEART RATE: 71 BPM | RESPIRATION RATE: 17 BRPM | SYSTOLIC BLOOD PRESSURE: 125 MMHG | OXYGEN SATURATION: 97 % | WEIGHT: 162.2 LBS | TEMPERATURE: 98 F

## 2024-10-28 DIAGNOSIS — Z00.00 VISIT FOR WELL MAN HEALTH CHECK: Primary | ICD-10-CM

## 2024-10-28 DIAGNOSIS — E78.00 ELEVATED CHOLESTEROL: ICD-10-CM

## 2024-10-28 PROCEDURE — 99395 PREV VISIT EST AGE 18-39: CPT | Performed by: FAMILY MEDICINE

## 2024-10-28 ASSESSMENT — PATIENT HEALTH QUESTIONNAIRE - PHQ9
SUM OF ALL RESPONSES TO PHQ9 QUESTIONS 1 & 2: 0
SUM OF ALL RESPONSES TO PHQ QUESTIONS 1-9: 0
1. LITTLE INTEREST OR PLEASURE IN DOING THINGS: NOT AT ALL
SUM OF ALL RESPONSES TO PHQ QUESTIONS 1-9: 0
2. FEELING DOWN, DEPRESSED OR HOPELESS: NOT AT ALL
SUM OF ALL RESPONSES TO PHQ QUESTIONS 1-9: 0
SUM OF ALL RESPONSES TO PHQ QUESTIONS 1-9: 0

## 2024-10-28 NOTE — PROGRESS NOTES
\"Have you been to the ER, urgent care clinic since your last visit?  Hospitalized since your last visit?\"    NO    “Have you seen or consulted any other health care providers outside our system since your last visit?”    NO  Chief Complaint   Patient presents with    Annual Exam     Chief Complaint   Patient presents with    Annual Exam     he is a 34 y.o. year old male who presents for CPE.  Complete Physical Exam Questions:    1.  Do you follow a low fat diet?  yes  2.  Are you up to date on your Tdap (<10 years)?  Unknown  3.  Have you ever had a Pneumovax vaccine (>65)?  Not applicable   PCV13 Not applicable   PPSV23 Not applicable  4.  Have you had Zoster vaccine (>60)? Not applicable  5.  Have you had the HPV - Gardasil (13- 26)? No  6.  Do you follow an exercise program?  Yes  7.  Do you smoke?  No If > 65 and smoker, have you had a abdominal aortic aneurysm ultrasound screen?  Not applicable  8.  Do you consider yourself overweight?  No  9.  Is there a family history of CAD< age 50?  No  10.  Is there a family history of Cancer?  No  11.  Do you know your Cancer risks? No  12.  Have you had a colonoscopy?  Not applicable  13. Have you been tested for HIV or other STI's? Yes HIV today(18-66 y/o)?No   14.  Have you had an EKG in the last five years(>50)?Not applicable  15.  Have you had a PSA test done this year (50-69)? Not applicable        Reviewed and agree with Nurse Note and duplicated in this note.  Reviewed PmHx, RxHx, FmHx, SocHx, AllgHx and updated and dated in the chart.    Family History   Problem Relation Age of Onset    Cirrhosis Father     Diabetes Maternal Grandmother        Past Medical History:   Diagnosis Date    ADHD (attention deficit hyperactivity disorder)       Social History     Socioeconomic History    Marital status: Single     Spouse name: None    Number of children: 0    Years of education: None    Highest education level: Master's degree (e.g., MA, MS, Jolene, MEd, MSW, BACILIO)

## 2024-11-04 RX ORDER — NAPROXEN 500 MG/1
500 TABLET ORAL 2 TIMES DAILY WITH MEALS
Qty: 30 TABLET | Refills: 0 | Status: SHIPPED | OUTPATIENT
Start: 2024-11-04

## 2025-01-06 ENCOUNTER — OFFICE VISIT (OUTPATIENT)
Facility: CLINIC | Age: 35
End: 2025-01-06
Payer: COMMERCIAL

## 2025-01-06 VITALS
RESPIRATION RATE: 16 BRPM | BODY MASS INDEX: 23.62 KG/M2 | HEART RATE: 74 BPM | TEMPERATURE: 98.5 F | DIASTOLIC BLOOD PRESSURE: 87 MMHG | SYSTOLIC BLOOD PRESSURE: 130 MMHG | HEIGHT: 70 IN | WEIGHT: 165 LBS | OXYGEN SATURATION: 99 %

## 2025-01-06 DIAGNOSIS — L84 CORN OF FOOT: Primary | ICD-10-CM

## 2025-01-06 DIAGNOSIS — E78.00 ELEVATED CHOLESTEROL: ICD-10-CM

## 2025-01-06 DIAGNOSIS — M75.51 ACUTE BURSITIS OF RIGHT SHOULDER: ICD-10-CM

## 2025-01-06 DIAGNOSIS — Z20.2 POSSIBLE EXPOSURE TO STD: ICD-10-CM

## 2025-01-06 DIAGNOSIS — M76.891 HAMSTRING TENDONITIS OF RIGHT THIGH: ICD-10-CM

## 2025-01-06 PROCEDURE — 99214 OFFICE O/P EST MOD 30 MIN: CPT | Performed by: FAMILY MEDICINE

## 2025-01-06 ASSESSMENT — PATIENT HEALTH QUESTIONNAIRE - PHQ9
SUM OF ALL RESPONSES TO PHQ QUESTIONS 1-9: 0
SUM OF ALL RESPONSES TO PHQ QUESTIONS 1-9: 0
2. FEELING DOWN, DEPRESSED OR HOPELESS: NOT AT ALL
1. LITTLE INTEREST OR PLEASURE IN DOING THINGS: NOT AT ALL
SUM OF ALL RESPONSES TO PHQ9 QUESTIONS 1 & 2: 0
SUM OF ALL RESPONSES TO PHQ QUESTIONS 1-9: 0
SUM OF ALL RESPONSES TO PHQ QUESTIONS 1-9: 0

## 2025-01-06 NOTE — PROGRESS NOTES
Chief Complaint   Patient presents with    Foot Issues    Exposure to STD     History of Present Illness  The patient is a 34-year-old male who presents for evaluation of corn on his foot, STD testing, and physical therapy.    He has been experiencing a corn on his foot for approximately 2 to 3 years, which has been causing him discomfort. Additionally, he reports the presence of a callus on the top of his toe, which is not associated with any pain.    He expresses a desire to undergo STD testing, even though he does not exhibit any symptoms indicative of an STD.    He is currently engaged in physical therapy at [facility], specifically targeting his shoulder and hamstring. He reports no complications arising from the therapy. He has been informed that he may be suffering from bursitis in the tendon.        Reviewed and agree with Nurse Note and duplicated in this note.  Reviewed PmHx, RxHx, FmHx, SocHx, AllgHx and updated and dated in the chart.    Family History   Problem Relation Age of Onset    Cirrhosis Father     Diabetes Maternal Grandmother        Past Medical History:   Diagnosis Date    ADHD (attention deficit hyperactivity disorder)       Social History     Socioeconomic History    Marital status: Single    Number of children: 0    Highest education level: Master's degree (e.g., MA, MS, Jolene, MEd, MSW, BACILIO)   Tobacco Use    Smoking status: Never     Passive exposure: Never    Smokeless tobacco: Never   Vaping Use    Vaping status: Never Used   Substance and Sexual Activity    Alcohol use: Not Currently    Drug use: No    Sexual activity: Not Currently     Partners: Female   Social History Narrative    Master's Degree. Single with no children.      Social Determinants of Health     Financial Resource Strain: Low Risk  (8/28/2024)    Overall Financial Resource Strain (CARDIA)     Difficulty of Paying Living Expenses: Not hard at all   Food Insecurity: No Food Insecurity (8/28/2024)    Hunger Vital Sign     Worried

## 2025-01-07 LAB
CHOLEST SERPL-MCNC: 191 MG/DL
HDLC SERPL-MCNC: 66 MG/DL
HDLC SERPL: 2.9 (ref 0–5)
HIV 1+2 AB+HIV1 P24 AG SERPL QL IA: NONREACTIVE
HIV 1/2 RESULT COMMENT: NORMAL
LDLC SERPL CALC-MCNC: 107.2 MG/DL (ref 0–100)
RPR SER QL: NONREACTIVE
TRIGL SERPL-MCNC: 89 MG/DL
VLDLC SERPL CALC-MCNC: 17.8 MG/DL

## 2025-01-08 LAB
C TRACH RRNA UR QL NAA+PROBE: NEGATIVE
HSV1 IGG SER IA-ACNC: REACTIVE
HSV2 IGG SER IA-ACNC: NON REACTIVE
M GENITALIUM DNA SPEC QL NAA+PROBE: NEGATIVE
N GONORRHOEA RRNA UR QL NAA+PROBE: NEGATIVE
SPECIMEN SOURCE: NORMAL

## 2025-02-18 ENCOUNTER — TELEMEDICINE (OUTPATIENT)
Facility: CLINIC | Age: 35
End: 2025-02-18
Payer: COMMERCIAL

## 2025-02-18 DIAGNOSIS — F90.0 ATTENTION DEFICIT HYPERACTIVITY DISORDER (ADHD), PREDOMINANTLY INATTENTIVE TYPE: ICD-10-CM

## 2025-02-18 DIAGNOSIS — K21.9 GASTROESOPHAGEAL REFLUX DISEASE WITHOUT ESOPHAGITIS: Primary | ICD-10-CM

## 2025-02-18 PROCEDURE — 99214 OFFICE O/P EST MOD 30 MIN: CPT | Performed by: FAMILY MEDICINE

## 2025-02-18 RX ORDER — OMEPRAZOLE 40 MG/1
40 CAPSULE, DELAYED RELEASE ORAL
Qty: 30 CAPSULE | Refills: 0 | Status: SHIPPED | OUTPATIENT
Start: 2025-02-18

## 2025-02-18 NOTE — PROGRESS NOTES
years Vaccine  Aged Out       PHYSICAL EXAMINATION:  [ INSTRUCTIONS:  \"[x]\" Indicates a positive item  \"[]\" Indicates a negative item  -- DELETE ALL ITEMS NOT EXAMINED]  Vital Signs: (As obtained by patient/caregiver or practitioner observation)    Blood pressure-  Heart rate-    Respiratory rate-    Temperature-  Pulse oximetry-   Physical Exam       Constitutional: [x] Appears well-developed and well-nourished [] No apparent distress      [] Abnormal-   Mental status  [x] Alert and awake  [x] Oriented to person/place/time []Able to follow commands      Eyes:  EOM    [x]  Normal  [] Abnormal-  Sclera  [x]  Normal  [] Abnormal -         Discharge [x]  None visible  [] Abnormal -    HENT:   [x] Normocephalic, atraumatic.  [] Abnormal   [x] Mouth/Throat: Mucous membranes are moist.     External Ears [x] Normal  [] Abnormal-     Neck: [x] No visualized mass     Pulmonary/Chest: [x] Respiratory effort normal.  [x] No visualized signs of difficulty breathing or respiratory distress        [] Abnormal-      Musculoskeletal:   [x] Normal gait with no signs of ataxia         [x] Normal range of motion of neck        [] Abnormal-       Neurological:        [x] No Facial Asymmetry (Cranial nerve 7 motor function) (limited exam to video visit)          [x] No gaze palsy        [] Abnormal-         Skin:        [x] No significant exanthematous lesions or discoloration noted on facial skin         [] Abnormal-            Psychiatric:       [x] Normal Affect [] No Hallucinations        [] Abnormal-     Other pertinent observable physical exam findings-     ASSESSMENT/PLAN:  1. Gastroesophageal reflux disease without esophagitis    - [facility]  - omeprazole (PRILOSEC) 40 MG delayed release capsule; Take 1 capsule by mouth every morning (before breakfast)  Dispense: 30 capsule; Refill: 0    2. Attention deficit hyperactivity disorder (ADHD), predominantly inattentive

## 2025-03-12 RX ORDER — VALACYCLOVIR HYDROCHLORIDE 1 G/1
2000 TABLET, FILM COATED ORAL 2 TIMES DAILY
Qty: 4 TABLET | Refills: 0 | Status: SHIPPED | OUTPATIENT
Start: 2025-03-12 | End: 2025-03-13

## 2025-05-13 DIAGNOSIS — M25.511 ACUTE PAIN OF RIGHT SHOULDER: Primary | ICD-10-CM

## 2025-08-28 DIAGNOSIS — K21.9 GASTROESOPHAGEAL REFLUX DISEASE WITHOUT ESOPHAGITIS: ICD-10-CM

## 2025-08-28 RX ORDER — OMEPRAZOLE 40 MG/1
40 CAPSULE, DELAYED RELEASE ORAL
Qty: 30 CAPSULE | Refills: 0 | Status: SHIPPED | OUTPATIENT
Start: 2025-08-28